# Patient Record
Sex: MALE | Race: WHITE | NOT HISPANIC OR LATINO | URBAN - METROPOLITAN AREA
[De-identification: names, ages, dates, MRNs, and addresses within clinical notes are randomized per-mention and may not be internally consistent; named-entity substitution may affect disease eponyms.]

---

## 2017-04-03 ENCOUNTER — COMMUNICATION - HEALTHEAST (OUTPATIENT)
Dept: FAMILY MEDICINE | Facility: CLINIC | Age: 77
End: 2017-04-03

## 2017-04-06 ENCOUNTER — COMMUNICATION - HEALTHEAST (OUTPATIENT)
Dept: FAMILY MEDICINE | Facility: CLINIC | Age: 77
End: 2017-04-06

## 2017-05-04 ENCOUNTER — OFFICE VISIT - HEALTHEAST (OUTPATIENT)
Dept: FAMILY MEDICINE | Facility: CLINIC | Age: 77
End: 2017-05-04

## 2017-05-04 DIAGNOSIS — J44.89 CHRONIC AIRWAY OBSTRUCTION, NOT ELSEWHERE CLASSIFIED: ICD-10-CM

## 2017-05-04 DIAGNOSIS — G60.9 HEREDITARY AND IDIOPATHIC PERIPHERAL NEUROPATHY: ICD-10-CM

## 2017-05-04 DIAGNOSIS — D51.0 PERNICIOUS ANEMIA: ICD-10-CM

## 2017-05-04 DIAGNOSIS — K59.00 CONSTIPATION: ICD-10-CM

## 2017-05-04 DIAGNOSIS — G40.909 EPILEPSY (H): ICD-10-CM

## 2017-05-04 DIAGNOSIS — K64.4 EXTERNAL HEMORRHOIDS: ICD-10-CM

## 2017-05-04 DIAGNOSIS — E78.5 HYPERLIPIDEMIA, UNSPECIFIED HYPERLIPIDEMIA TYPE: ICD-10-CM

## 2017-05-04 LAB
CHOLEST SERPL-MCNC: 186 MG/DL
FASTING STATUS PATIENT QL REPORTED: NO
HDLC SERPL-MCNC: 36 MG/DL
LDLC SERPL CALC-MCNC: 132 MG/DL
TRIGL SERPL-MCNC: 89 MG/DL

## 2017-05-07 ENCOUNTER — COMMUNICATION - HEALTHEAST (OUTPATIENT)
Dept: FAMILY MEDICINE | Facility: CLINIC | Age: 77
End: 2017-05-07

## 2017-05-07 DIAGNOSIS — G40.909 EPILEPSY (H): ICD-10-CM

## 2017-05-08 ENCOUNTER — COMMUNICATION - HEALTHEAST (OUTPATIENT)
Dept: FAMILY MEDICINE | Facility: CLINIC | Age: 77
End: 2017-05-08

## 2017-05-10 ENCOUNTER — RECORDS - HEALTHEAST (OUTPATIENT)
Dept: ADMINISTRATIVE | Facility: OTHER | Age: 77
End: 2017-05-10

## 2017-05-15 ENCOUNTER — COMMUNICATION - HEALTHEAST (OUTPATIENT)
Dept: FAMILY MEDICINE | Facility: CLINIC | Age: 77
End: 2017-05-15

## 2017-05-15 DIAGNOSIS — I10 ESSENTIAL HYPERTENSION: ICD-10-CM

## 2017-05-16 ENCOUNTER — COMMUNICATION - HEALTHEAST (OUTPATIENT)
Dept: LAB | Facility: CLINIC | Age: 77
End: 2017-05-16

## 2017-05-16 DIAGNOSIS — Z80.42 FAMILY HISTORY OF PROSTATE CANCER: ICD-10-CM

## 2017-05-16 DIAGNOSIS — Z00.00 PREVENTATIVE HEALTH CARE: ICD-10-CM

## 2017-05-16 DIAGNOSIS — R97.20 ELEVATED PSA: ICD-10-CM

## 2017-05-18 ENCOUNTER — AMBULATORY - HEALTHEAST (OUTPATIENT)
Dept: LAB | Facility: CLINIC | Age: 77
End: 2017-05-18

## 2017-05-18 DIAGNOSIS — R97.20 ELEVATED PSA: ICD-10-CM

## 2017-05-18 LAB — PSA SERPL-MCNC: 2 NG/ML (ref 0–6.5)

## 2017-05-19 ENCOUNTER — COMMUNICATION - HEALTHEAST (OUTPATIENT)
Dept: FAMILY MEDICINE | Facility: CLINIC | Age: 77
End: 2017-05-19

## 2017-06-20 ENCOUNTER — COMMUNICATION - HEALTHEAST (OUTPATIENT)
Dept: FAMILY MEDICINE | Facility: CLINIC | Age: 77
End: 2017-06-20

## 2017-06-20 DIAGNOSIS — I10 UNSPECIFIED ESSENTIAL HYPERTENSION: ICD-10-CM

## 2017-07-02 ENCOUNTER — COMMUNICATION - HEALTHEAST (OUTPATIENT)
Dept: FAMILY MEDICINE | Facility: CLINIC | Age: 77
End: 2017-07-02

## 2017-07-02 DIAGNOSIS — N40.0 BPH (BENIGN PROSTATIC HYPERPLASIA): ICD-10-CM

## 2017-09-18 ENCOUNTER — COMMUNICATION - HEALTHEAST (OUTPATIENT)
Dept: FAMILY MEDICINE | Facility: CLINIC | Age: 77
End: 2017-09-18

## 2017-09-18 DIAGNOSIS — I10 UNSPECIFIED ESSENTIAL HYPERTENSION: ICD-10-CM

## 2017-11-11 ENCOUNTER — COMMUNICATION - HEALTHEAST (OUTPATIENT)
Dept: FAMILY MEDICINE | Facility: CLINIC | Age: 77
End: 2017-11-11

## 2017-11-11 DIAGNOSIS — I10 ESSENTIAL HYPERTENSION: ICD-10-CM

## 2017-12-17 ENCOUNTER — COMMUNICATION - HEALTHEAST (OUTPATIENT)
Dept: FAMILY MEDICINE | Facility: CLINIC | Age: 77
End: 2017-12-17

## 2017-12-17 DIAGNOSIS — I10 ESSENTIAL HYPERTENSION: ICD-10-CM

## 2017-12-20 ENCOUNTER — COMMUNICATION - HEALTHEAST (OUTPATIENT)
Dept: FAMILY MEDICINE | Facility: CLINIC | Age: 77
End: 2017-12-20

## 2018-01-16 ENCOUNTER — OFFICE VISIT - HEALTHEAST (OUTPATIENT)
Dept: FAMILY MEDICINE | Facility: CLINIC | Age: 78
End: 2018-01-16

## 2018-01-16 DIAGNOSIS — F17.200 TOBACCO USE DISORDER: ICD-10-CM
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
## 2018-01-16 DIAGNOSIS — M79.602 LEFT ARM PAIN: ICD-10-CM

## 2018-01-16 DIAGNOSIS — J44.9 CHRONIC OBSTRUCTIVE PULMONARY DISEASE, UNSPECIFIED COPD TYPE (H): ICD-10-CM

## 2018-01-16 DIAGNOSIS — R05.8 PRODUCTIVE COUGH: ICD-10-CM

## 2018-01-16 DIAGNOSIS — G40.909 NONINTRACTABLE EPILEPSY WITHOUT STATUS EPILEPTICUS, UNSPECIFIED EPILEPSY TYPE (H): ICD-10-CM

## 2018-01-16 DIAGNOSIS — R63.4 ABNORMAL WEIGHT LOSS: ICD-10-CM

## 2018-01-16 DIAGNOSIS — I10 ESSENTIAL HYPERTENSION: ICD-10-CM

## 2018-01-16 LAB
ALBUMIN SERPL-MCNC: 3.8 G/DL (ref 3.5–5)
ALP SERPL-CCNC: 75 U/L (ref 45–120)
ALT SERPL W P-5'-P-CCNC: 10 U/L (ref 0–45)
ANION GAP SERPL CALCULATED.3IONS-SCNC: 9 MMOL/L (ref 5–18)
AST SERPL W P-5'-P-CCNC: 12 U/L (ref 0–40)
BILIRUB SERPL-MCNC: 0.6 MG/DL (ref 0–1)
BUN SERPL-MCNC: 12 MG/DL (ref 8–28)
CALCIUM SERPL-MCNC: 9.8 MG/DL (ref 8.5–10.5)
CHLORIDE BLD-SCNC: 106 MMOL/L (ref 98–107)
CO2 SERPL-SCNC: 26 MMOL/L (ref 22–31)
CREAT SERPL-MCNC: 0.68 MG/DL (ref 0.7–1.3)
ERYTHROCYTE [DISTWIDTH] IN BLOOD BY AUTOMATED COUNT: 12.2 % (ref 11–14.5)
GFR SERPL CREATININE-BSD FRML MDRD: >60 ML/MIN/1.73M2
GLUCOSE BLD-MCNC: 122 MG/DL (ref 70–125)
HCT VFR BLD AUTO: 47.6 % (ref 40–54)
HGB BLD-MCNC: 15.7 G/DL (ref 14–18)
MCH RBC QN AUTO: 31.5 PG (ref 27–34)
MCHC RBC AUTO-ENTMCNC: 32.9 G/DL (ref 32–36)
MCV RBC AUTO: 96 FL (ref 80–100)
PLATELET # BLD AUTO: 205 THOU/UL (ref 140–440)
PMV BLD AUTO: 7.4 FL (ref 7–10)
POTASSIUM BLD-SCNC: 4.4 MMOL/L (ref 3.5–5)
PROT SERPL-MCNC: 7.3 G/DL (ref 6–8)
RBC # BLD AUTO: 4.97 MILL/UL (ref 4.4–6.2)
SODIUM SERPL-SCNC: 141 MMOL/L (ref 136–145)
TSH SERPL DL<=0.005 MIU/L-ACNC: 0.68 UIU/ML (ref 0.3–5)
WBC: 5 THOU/UL (ref 4–11)

## 2018-01-17 ENCOUNTER — COMMUNICATION - HEALTHEAST (OUTPATIENT)
Dept: FAMILY MEDICINE | Facility: CLINIC | Age: 78
End: 2018-01-17

## 2018-01-18 ENCOUNTER — AMBULATORY - HEALTHEAST (OUTPATIENT)
Dept: FAMILY MEDICINE | Facility: CLINIC | Age: 78
End: 2018-01-18

## 2018-01-18 DIAGNOSIS — R29.6 FREQUENT FALLS: ICD-10-CM

## 2018-01-22 ENCOUNTER — COMMUNICATION - HEALTHEAST (OUTPATIENT)
Dept: NURSING | Facility: CLINIC | Age: 78
End: 2018-01-22

## 2018-03-18 ENCOUNTER — COMMUNICATION - HEALTHEAST (OUTPATIENT)
Dept: FAMILY MEDICINE | Facility: CLINIC | Age: 78
End: 2018-03-18

## 2018-03-18 DIAGNOSIS — I10 ESSENTIAL HYPERTENSION: ICD-10-CM

## 2018-04-01 ENCOUNTER — COMMUNICATION - HEALTHEAST (OUTPATIENT)
Dept: FAMILY MEDICINE | Facility: CLINIC | Age: 78
End: 2018-04-01

## 2018-04-01 DIAGNOSIS — N40.0 BPH (BENIGN PROSTATIC HYPERPLASIA): ICD-10-CM

## 2018-05-05 ENCOUNTER — COMMUNICATION - HEALTHEAST (OUTPATIENT)
Dept: FAMILY MEDICINE | Facility: CLINIC | Age: 78
End: 2018-05-05

## 2018-05-05 DIAGNOSIS — G40.909 EPILEPSY (H): ICD-10-CM

## 2018-05-05 DIAGNOSIS — I10 ESSENTIAL HYPERTENSION: ICD-10-CM

## 2018-07-18 ENCOUNTER — HOSPITAL ENCOUNTER (OUTPATIENT)
Dept: PHYSICAL MEDICINE AND REHAB | Facility: CLINIC | Age: 78
Discharge: HOME | End: 2018-07-18
Attending: PHYSICAL MEDICINE & REHABILITATION

## 2018-07-18 DIAGNOSIS — G72.9 MYOPATHY: ICD-10-CM

## 2018-07-18 DIAGNOSIS — M12.88 OTHER SPECIFIC ARTHROPATHIES, NOT ELSEWHERE CLASSIFIED, OTHER SPECIFIED SITE: ICD-10-CM

## 2018-07-18 DIAGNOSIS — M54.50 LUMBAR SPINE PAIN: ICD-10-CM

## 2018-07-18 DIAGNOSIS — M62.81 PROXIMAL MUSCLE WEAKNESS: ICD-10-CM

## 2018-07-18 DIAGNOSIS — M79.18 MYOFASCIAL PAIN: ICD-10-CM

## 2018-07-18 DIAGNOSIS — M47.816 LUMBAR SPONDYLOSIS: ICD-10-CM

## 2018-07-18 DIAGNOSIS — M47.816 ARTHROPATHY OF LUMBAR FACET JOINT: ICD-10-CM

## 2018-07-18 ASSESSMENT — MIFFLIN-ST. JEOR: SCORE: 1511.85

## 2018-07-26 ENCOUNTER — COMMUNICATION - HEALTHEAST (OUTPATIENT)
Dept: PHYSICAL MEDICINE AND REHAB | Facility: CLINIC | Age: 78
End: 2018-07-26

## 2018-07-26 DIAGNOSIS — M47.816 FACET ARTHROPATHY, LUMBAR: ICD-10-CM

## 2018-07-30 ENCOUNTER — COMMUNICATION - HEALTHEAST (OUTPATIENT)
Dept: PHYSICAL MEDICINE AND REHAB | Facility: CLINIC | Age: 78
End: 2018-07-30

## 2018-08-01 ENCOUNTER — HOSPITAL ENCOUNTER (OUTPATIENT)
Dept: PHYSICAL MEDICINE AND REHAB | Facility: CLINIC | Age: 78
Discharge: HOME | End: 2018-08-01
Attending: PHYSICAL MEDICINE & REHABILITATION

## 2018-08-01 DIAGNOSIS — M47.816 FACET ARTHROPATHY, LUMBAR: ICD-10-CM

## 2018-08-01 DIAGNOSIS — M12.88 OTHER SPECIFIC ARTHROPATHIES, NOT ELSEWHERE CLASSIFIED, OTHER SPECIFIED SITE: ICD-10-CM

## 2018-08-04 ENCOUNTER — COMMUNICATION - HEALTHEAST (OUTPATIENT)
Dept: FAMILY MEDICINE | Facility: CLINIC | Age: 78
End: 2018-08-04

## 2018-08-04 DIAGNOSIS — G40.909 EPILEPSY (H): ICD-10-CM

## 2018-08-04 DIAGNOSIS — I10 ESSENTIAL HYPERTENSION: ICD-10-CM

## 2018-08-16 ENCOUNTER — HOSPITAL ENCOUNTER (OUTPATIENT)
Dept: PHYSICAL MEDICINE AND REHAB | Facility: CLINIC | Age: 78
Discharge: HOME | End: 2018-08-16
Attending: PHYSICAL MEDICINE & REHABILITATION

## 2018-08-16 DIAGNOSIS — G72.9 MYOPATHY: ICD-10-CM

## 2018-08-16 DIAGNOSIS — M47.816 FACET ARTHROPATHY, LUMBAR: ICD-10-CM

## 2018-08-16 DIAGNOSIS — M62.81 PROXIMAL MUSCLE WEAKNESS: ICD-10-CM

## 2018-08-16 DIAGNOSIS — M54.50 LUMBAR SPINE PAIN: ICD-10-CM

## 2018-08-16 DIAGNOSIS — M79.18 MYOFASCIAL PAIN: ICD-10-CM

## 2018-08-16 ASSESSMENT — MIFFLIN-ST. JEOR: SCORE: 1511.85

## 2018-09-19 ENCOUNTER — RECORDS - HEALTHEAST (OUTPATIENT)
Dept: ADMINISTRATIVE | Facility: OTHER | Age: 78
End: 2018-09-19

## 2018-11-03 ENCOUNTER — COMMUNICATION - HEALTHEAST (OUTPATIENT)
Dept: FAMILY MEDICINE | Facility: CLINIC | Age: 78
End: 2018-11-03

## 2018-11-03 DIAGNOSIS — I10 ESSENTIAL HYPERTENSION: ICD-10-CM

## 2018-11-03 DIAGNOSIS — G40.909 EPILEPSY (H): ICD-10-CM

## 2018-12-05 ENCOUNTER — HOSPITAL ENCOUNTER (OUTPATIENT)
Dept: PHYSICAL MEDICINE AND REHAB | Facility: CLINIC | Age: 78
Discharge: HOME | End: 2018-12-05
Attending: PHYSICAL MEDICINE & REHABILITATION

## 2018-12-05 DIAGNOSIS — M47.816 FACET ARTHROPATHY, LUMBAR: ICD-10-CM

## 2018-12-05 DIAGNOSIS — M62.81 PROXIMAL MUSCLE WEAKNESS: ICD-10-CM

## 2018-12-05 DIAGNOSIS — G72.9 MYOPATHY: ICD-10-CM

## 2018-12-05 DIAGNOSIS — M79.18 MYOFASCIAL PAIN: ICD-10-CM

## 2018-12-05 DIAGNOSIS — M54.50 LUMBAR SPINE PAIN: ICD-10-CM

## 2018-12-05 ASSESSMENT — MIFFLIN-ST. JEOR: SCORE: 1516.39

## 2018-12-18 ENCOUNTER — COMMUNICATION - HEALTHEAST (OUTPATIENT)
Dept: FAMILY MEDICINE | Facility: CLINIC | Age: 78
End: 2018-12-18

## 2018-12-18 ENCOUNTER — OFFICE VISIT - HEALTHEAST (OUTPATIENT)
Dept: FAMILY MEDICINE | Facility: CLINIC | Age: 78
End: 2018-12-18

## 2018-12-18 DIAGNOSIS — I10 ESSENTIAL HYPERTENSION: ICD-10-CM

## 2018-12-18 DIAGNOSIS — R29.898 LEG WEAKNESS, BILATERAL: ICD-10-CM

## 2018-12-18 DIAGNOSIS — F33.1 MAJOR DEPRESSIVE DISORDER, RECURRENT EPISODE, MODERATE (H): ICD-10-CM

## 2018-12-18 DIAGNOSIS — I73.9 PERIPHERAL ARTERIAL DISEASE (H): ICD-10-CM

## 2018-12-18 DIAGNOSIS — R05.9 COUGH: ICD-10-CM

## 2019-01-01 ENCOUNTER — COMMUNICATION - HEALTHEAST (OUTPATIENT)
Dept: HOME HEALTH SERVICES | Facility: HOME HEALTH | Age: 79
End: 2019-01-01

## 2019-01-01 ENCOUNTER — HOME CARE/HOSPICE - HEALTHEAST (OUTPATIENT)
Dept: HOME HEALTH SERVICES | Facility: HOME HEALTH | Age: 79
End: 2019-01-01

## 2019-01-01 ENCOUNTER — COMMUNICATION - HEALTHEAST (OUTPATIENT)
Dept: FAMILY MEDICINE | Facility: CLINIC | Age: 79
End: 2019-01-01

## 2019-01-01 DIAGNOSIS — R29.898 WEAKNESS OF LEFT LOWER EXTREMITY: ICD-10-CM

## 2019-01-01 DIAGNOSIS — R29.6 FALLS FREQUENTLY: ICD-10-CM

## 2019-01-01 DIAGNOSIS — R29.898 LEG WEAKNESS, BILATERAL: ICD-10-CM

## 2019-01-15 ENCOUNTER — OFFICE VISIT - HEALTHEAST (OUTPATIENT)
Dept: FAMILY MEDICINE | Facility: CLINIC | Age: 79
End: 2019-01-15

## 2019-01-15 ENCOUNTER — HOSPITAL ENCOUNTER (OUTPATIENT)
Dept: LAB | Age: 79
Setting detail: SPECIMEN
Discharge: HOME | End: 2019-01-15

## 2019-01-15 DIAGNOSIS — I10 ESSENTIAL HYPERTENSION: ICD-10-CM

## 2019-01-15 DIAGNOSIS — R29.898 LEG WEAKNESS, BILATERAL: ICD-10-CM

## 2019-01-15 DIAGNOSIS — R13.10 DYSPHAGIA, UNSPECIFIED TYPE: ICD-10-CM

## 2019-01-15 DIAGNOSIS — R63.4 ABNORMAL WEIGHT LOSS: ICD-10-CM

## 2019-01-15 DIAGNOSIS — F33.1 MAJOR DEPRESSIVE DISORDER, RECURRENT EPISODE, MODERATE (H): ICD-10-CM

## 2019-01-15 DIAGNOSIS — Z00.01 ENCOUNTER FOR GENERAL ADULT MEDICAL EXAMINATION WITH ABNORMAL FINDINGS: ICD-10-CM

## 2019-01-15 DIAGNOSIS — J44.9 CHRONIC OBSTRUCTIVE PULMONARY DISEASE, UNSPECIFIED COPD TYPE (H): ICD-10-CM

## 2019-01-15 DIAGNOSIS — G40.909 NONINTRACTABLE EPILEPSY WITHOUT STATUS EPILEPTICUS, UNSPECIFIED EPILEPSY TYPE (H): ICD-10-CM

## 2019-01-15 DIAGNOSIS — I73.9 PERIPHERAL ARTERIAL DISEASE (H): ICD-10-CM

## 2019-01-15 LAB
ALBUMIN SERPL-MCNC: 3.8 G/DL (ref 3.5–5)
ALP SERPL-CCNC: 68 U/L (ref 45–120)
ALT SERPL W P-5'-P-CCNC: 10 U/L (ref 0–45)
ANION GAP SERPL CALCULATED.3IONS-SCNC: 8 MMOL/L (ref 5–18)
AST SERPL W P-5'-P-CCNC: 11 U/L (ref 0–40)
ATRIAL RATE - MUSE: 52 BPM
BILIRUB SERPL-MCNC: 0.7 MG/DL (ref 0–1)
BUN SERPL-MCNC: 9 MG/DL (ref 8–28)
CALCIUM SERPL-MCNC: 9.9 MG/DL (ref 8.5–10.5)
CHLORIDE BLD-SCNC: 106 MMOL/L (ref 98–107)
CK SERPL-CCNC: 47 U/L (ref 30–190)
CO2 SERPL-SCNC: 27 MMOL/L (ref 22–31)
CREAT SERPL-MCNC: 0.71 MG/DL (ref 0.7–1.3)
DIASTOLIC BLOOD PRESSURE - MUSE: NORMAL MMHG
ERYTHROCYTE [DISTWIDTH] IN BLOOD BY AUTOMATED COUNT: 12.3 % (ref 11–14.5)
GFR SERPL CREATININE-BSD FRML MDRD: >60 ML/MIN/1.73M2
GLUCOSE BLD-MCNC: 103 MG/DL (ref 70–125)
HCT VFR BLD AUTO: 45.2 % (ref 40–54)
HGB BLD-MCNC: 14.9 G/DL (ref 14–18)
INTERPRETATION ECG - MUSE: NORMAL
MCH RBC QN AUTO: 31.5 PG (ref 27–34)
MCHC RBC AUTO-ENTMCNC: 32.9 G/DL (ref 32–36)
MCV RBC AUTO: 96 FL (ref 80–100)
P AXIS - MUSE: 83 DEGREES
PLATELET # BLD AUTO: 196 THOU/UL (ref 140–440)
PMV BLD AUTO: 7.4 FL (ref 7–10)
POTASSIUM BLD-SCNC: 4.3 MMOL/L (ref 3.5–5)
PR INTERVAL - MUSE: 192 MS
PROT SERPL-MCNC: 7.1 G/DL (ref 6–8)
QRS DURATION - MUSE: 90 MS
QT - MUSE: 416 MS
QTC - MUSE: 386 MS
R AXIS - MUSE: 56 DEGREES
RBC # BLD AUTO: 4.72 MILL/UL (ref 4.4–6.2)
SODIUM SERPL-SCNC: 141 MMOL/L (ref 136–145)
SYSTOLIC BLOOD PRESSURE - MUSE: NORMAL MMHG
T AXIS - MUSE: 64 DEGREES
TSH SERPL DL<=0.005 MIU/L-ACNC: 0.55 UIU/ML (ref 0.3–5)
VENTRICULAR RATE- MUSE: 52 BPM
WBC: 4.3 THOU/UL (ref 4–11)

## 2019-01-15 ASSESSMENT — MIFFLIN-ST. JEOR: SCORE: 1520.92

## 2019-01-16 ENCOUNTER — COMMUNICATION - HEALTHEAST (OUTPATIENT)
Dept: FAMILY MEDICINE | Facility: CLINIC | Age: 79
End: 2019-01-16

## 2019-01-26 ENCOUNTER — COMMUNICATION - HEALTHEAST (OUTPATIENT)
Dept: FAMILY MEDICINE | Facility: CLINIC | Age: 79
End: 2019-01-26

## 2019-01-26 DIAGNOSIS — G40.909 EPILEPSY (H): ICD-10-CM

## 2019-02-03 ENCOUNTER — COMMUNICATION - HEALTHEAST (OUTPATIENT)
Dept: FAMILY MEDICINE | Facility: CLINIC | Age: 79
End: 2019-02-03

## 2019-02-03 DIAGNOSIS — I10 ESSENTIAL HYPERTENSION: ICD-10-CM

## 2019-02-05 ENCOUNTER — RECORDS - HEALTHEAST (OUTPATIENT)
Dept: ADMINISTRATIVE | Facility: OTHER | Age: 79
End: 2019-02-05

## 2019-03-06 ENCOUNTER — OFFICE VISIT - HEALTHEAST (OUTPATIENT)
Dept: FAMILY MEDICINE | Facility: CLINIC | Age: 79
End: 2019-03-06

## 2019-03-06 DIAGNOSIS — I10 ESSENTIAL HYPERTENSION: ICD-10-CM

## 2019-03-06 DIAGNOSIS — G40.909 NONINTRACTABLE EPILEPSY WITHOUT STATUS EPILEPTICUS, UNSPECIFIED EPILEPSY TYPE (H): ICD-10-CM

## 2019-03-06 DIAGNOSIS — R29.898 LEG WEAKNESS, BILATERAL: ICD-10-CM

## 2019-03-06 DIAGNOSIS — I73.9 PERIPHERAL ARTERIAL DISEASE (H): ICD-10-CM

## 2019-03-06 DIAGNOSIS — J44.9 CHRONIC OBSTRUCTIVE PULMONARY DISEASE, UNSPECIFIED COPD TYPE (H): ICD-10-CM

## 2019-03-06 DIAGNOSIS — R42 DIZZY SPELLS: ICD-10-CM

## 2019-03-06 RX ORDER — MULTIPLE VITAMINS W/ MINERALS TAB 9MG-400MCG
1 TAB ORAL DAILY
Status: SHIPPED | COMMUNITY
Start: 2019-03-06

## 2019-03-09 ENCOUNTER — COMMUNICATION - HEALTHEAST (OUTPATIENT)
Dept: FAMILY MEDICINE | Facility: CLINIC | Age: 79
End: 2019-03-09

## 2019-03-09 DIAGNOSIS — I10 ESSENTIAL HYPERTENSION: ICD-10-CM

## 2019-04-19 ENCOUNTER — RECORDS - HEALTHEAST (OUTPATIENT)
Dept: ADMINISTRATIVE | Facility: OTHER | Age: 79
End: 2019-04-19

## 2019-05-01 ENCOUNTER — OFFICE VISIT - HEALTHEAST (OUTPATIENT)
Dept: FAMILY MEDICINE | Facility: CLINIC | Age: 79
End: 2019-05-01

## 2019-05-01 ENCOUNTER — AMBULATORY - HEALTHEAST (OUTPATIENT)
Dept: PULMONOLOGY | Facility: OTHER | Age: 79
End: 2019-05-01

## 2019-05-01 DIAGNOSIS — R05.3 CHRONIC COUGH: ICD-10-CM

## 2019-05-01 DIAGNOSIS — F17.200 TOBACCO USE DISORDER: ICD-10-CM

## 2019-05-01 DIAGNOSIS — J44.9 COPD (CHRONIC OBSTRUCTIVE PULMONARY DISEASE) (H): ICD-10-CM

## 2019-05-01 DIAGNOSIS — J44.1 CHRONIC OBSTRUCTIVE PULMONARY DISEASE WITH ACUTE EXACERBATION (H): ICD-10-CM

## 2019-05-02 ENCOUNTER — AMBULATORY - HEALTHEAST (OUTPATIENT)
Dept: OTHER | Facility: CLINIC | Age: 79
End: 2019-05-02

## 2019-05-02 ENCOUNTER — DOCUMENTATION ONLY (OUTPATIENT)
Dept: OTHER | Facility: CLINIC | Age: 79
End: 2019-05-02

## 2019-05-13 ENCOUNTER — HOSPITAL ENCOUNTER (OUTPATIENT)
Dept: CT IMAGING | Facility: HOSPITAL | Age: 79
Discharge: HOME | End: 2019-05-13

## 2019-05-13 DIAGNOSIS — R05.3 CHRONIC COUGH: ICD-10-CM

## 2019-05-14 ENCOUNTER — COMMUNICATION - HEALTHEAST (OUTPATIENT)
Dept: FAMILY MEDICINE | Facility: CLINIC | Age: 79
End: 2019-05-14

## 2019-05-21 ENCOUNTER — COMMUNICATION - HEALTHEAST (OUTPATIENT)
Dept: ADMINISTRATIVE | Facility: CLINIC | Age: 79
End: 2019-05-21

## 2019-06-04 ENCOUNTER — HOSPITAL ENCOUNTER (OUTPATIENT)
Dept: RESPIRATORY THERAPY | Facility: HOSPITAL | Age: 79
Discharge: HOME | End: 2019-06-04
Attending: INTERNAL MEDICINE

## 2019-06-04 DIAGNOSIS — R05.3 CHRONIC COUGH: ICD-10-CM

## 2019-06-04 DIAGNOSIS — J44.9 COPD (CHRONIC OBSTRUCTIVE PULMONARY DISEASE) (H): ICD-10-CM

## 2019-06-04 LAB — HGB BLD-MCNC: 15 G/DL (ref 14–18)

## 2019-06-10 ENCOUNTER — OFFICE VISIT - HEALTHEAST (OUTPATIENT)
Dept: PULMONOLOGY | Facility: OTHER | Age: 79
End: 2019-06-10

## 2019-06-10 ENCOUNTER — RECORDS - HEALTHEAST (OUTPATIENT)
Dept: ADMINISTRATIVE | Facility: OTHER | Age: 79
End: 2019-06-10

## 2019-06-10 DIAGNOSIS — J41.0 SMOKERS' COUGH (H): ICD-10-CM

## 2019-06-10 DIAGNOSIS — R13.19 ESOPHAGEAL DYSPHAGIA: ICD-10-CM

## 2019-06-10 ASSESSMENT — MIFFLIN-ST. JEOR: SCORE: 1493.71

## 2019-06-11 ENCOUNTER — AMBULATORY - HEALTHEAST (OUTPATIENT)
Dept: PULMONOLOGY | Facility: OTHER | Age: 79
End: 2019-06-11

## 2019-06-11 DIAGNOSIS — R05.9 COUGH: ICD-10-CM

## 2019-06-20 ENCOUNTER — HOSPITAL ENCOUNTER (OUTPATIENT)
Dept: RADIOLOGY | Facility: HOSPITAL | Age: 79
Discharge: HOME | End: 2019-06-20
Attending: INTERNAL MEDICINE

## 2019-06-20 DIAGNOSIS — R13.19 ESOPHAGEAL DYSPHAGIA: ICD-10-CM

## 2019-06-20 DIAGNOSIS — R05.9 COUGH: ICD-10-CM

## 2019-06-23 ENCOUNTER — COMMUNICATION - HEALTHEAST (OUTPATIENT)
Dept: FAMILY MEDICINE | Facility: CLINIC | Age: 79
End: 2019-06-23

## 2019-06-23 DIAGNOSIS — N40.0 BPH (BENIGN PROSTATIC HYPERPLASIA): ICD-10-CM

## 2019-06-24 ENCOUNTER — COMMUNICATION - HEALTHEAST (OUTPATIENT)
Dept: PULMONOLOGY | Facility: OTHER | Age: 79
End: 2019-06-24

## 2019-07-01 ENCOUNTER — COMMUNICATION - HEALTHEAST (OUTPATIENT)
Dept: PULMONOLOGY | Facility: OTHER | Age: 79
End: 2019-07-01

## 2019-08-27 ENCOUNTER — RECORDS - HEALTHEAST (OUTPATIENT)
Dept: ADMINISTRATIVE | Facility: OTHER | Age: 79
End: 2019-08-27

## 2019-10-04 ENCOUNTER — COMMUNICATION - HEALTHEAST (OUTPATIENT)
Dept: FAMILY MEDICINE | Facility: CLINIC | Age: 79
End: 2019-10-04

## 2019-10-16 ENCOUNTER — OFFICE VISIT - HEALTHEAST (OUTPATIENT)
Dept: FAMILY MEDICINE | Facility: CLINIC | Age: 79
End: 2019-10-16

## 2019-10-16 DIAGNOSIS — I10 ESSENTIAL HYPERTENSION: ICD-10-CM

## 2019-10-16 DIAGNOSIS — R29.6 FALLS FREQUENTLY: ICD-10-CM

## 2019-10-16 DIAGNOSIS — J44.1 CHRONIC OBSTRUCTIVE PULMONARY DISEASE WITH ACUTE EXACERBATION (H): ICD-10-CM

## 2019-10-16 DIAGNOSIS — R29.898 LEG WEAKNESS, BILATERAL: ICD-10-CM

## 2019-10-16 DIAGNOSIS — G40.909 NONINTRACTABLE EPILEPSY WITHOUT STATUS EPILEPTICUS, UNSPECIFIED EPILEPSY TYPE (H): ICD-10-CM

## 2019-10-16 DIAGNOSIS — F33.1 MAJOR DEPRESSIVE DISORDER, RECURRENT EPISODE, MODERATE (H): ICD-10-CM

## 2019-10-16 DIAGNOSIS — I73.9 PERIPHERAL ARTERIAL DISEASE (H): ICD-10-CM

## 2019-10-16 LAB
ALBUMIN SERPL-MCNC: 3.6 G/DL (ref 3.5–5)
ALP SERPL-CCNC: 64 U/L (ref 45–120)
ALT SERPL W P-5'-P-CCNC: 9 U/L (ref 0–45)
ANION GAP SERPL CALCULATED.3IONS-SCNC: 10 MMOL/L (ref 5–18)
AST SERPL W P-5'-P-CCNC: 11 U/L (ref 0–40)
BILIRUB SERPL-MCNC: 0.7 MG/DL (ref 0–1)
BUN SERPL-MCNC: 10 MG/DL (ref 8–28)
CALCIUM SERPL-MCNC: 9.7 MG/DL (ref 8.5–10.5)
CHLORIDE BLD-SCNC: 105 MMOL/L (ref 98–107)
CK SERPL-CCNC: 58 U/L (ref 30–190)
CO2 SERPL-SCNC: 25 MMOL/L (ref 22–31)
CREAT SERPL-MCNC: 0.74 MG/DL (ref 0.7–1.3)
ERYTHROCYTE [DISTWIDTH] IN BLOOD BY AUTOMATED COUNT: 12 % (ref 11–14.5)
GFR SERPL CREATININE-BSD FRML MDRD: >60 ML/MIN/1.73M2
GLUCOSE BLD-MCNC: 121 MG/DL (ref 70–125)
HCT VFR BLD AUTO: 44.5 % (ref 40–54)
HGB BLD-MCNC: 15 G/DL (ref 14–18)
MCH RBC QN AUTO: 31.9 PG (ref 27–34)
MCHC RBC AUTO-ENTMCNC: 33.8 G/DL (ref 32–36)
MCV RBC AUTO: 95 FL (ref 80–100)
PLATELET # BLD AUTO: 169 THOU/UL (ref 140–440)
PMV BLD AUTO: 8.1 FL (ref 7–10)
POTASSIUM BLD-SCNC: 3.6 MMOL/L (ref 3.5–5)
PROT SERPL-MCNC: 6.6 G/DL (ref 6–8)
RBC # BLD AUTO: 4.71 MILL/UL (ref 4.4–6.2)
SODIUM SERPL-SCNC: 140 MMOL/L (ref 136–145)
TSH SERPL DL<=0.005 MIU/L-ACNC: 0.36 UIU/ML (ref 0.3–5)
WBC: 4.4 THOU/UL (ref 4–11)

## 2019-10-17 ENCOUNTER — COMMUNICATION - HEALTHEAST (OUTPATIENT)
Dept: FAMILY MEDICINE | Facility: CLINIC | Age: 79
End: 2019-10-17

## 2019-10-17 ENCOUNTER — HOME CARE/HOSPICE - HEALTHEAST (OUTPATIENT)
Dept: HOME HEALTH SERVICES | Facility: HOME HEALTH | Age: 79
End: 2019-10-17

## 2019-10-18 ENCOUNTER — RECORDS - HEALTHEAST (OUTPATIENT)
Dept: ADMINISTRATIVE | Facility: OTHER | Age: 79
End: 2019-10-18

## 2019-10-21 ENCOUNTER — COMMUNICATION - HEALTHEAST (OUTPATIENT)
Dept: SCHEDULING | Facility: CLINIC | Age: 79
End: 2019-10-21

## 2019-10-24 ENCOUNTER — COMMUNICATION - HEALTHEAST (OUTPATIENT)
Dept: FAMILY MEDICINE | Facility: CLINIC | Age: 79
End: 2019-10-24

## 2019-10-31 ENCOUNTER — COMMUNICATION - HEALTHEAST (OUTPATIENT)
Dept: SCHEDULING | Facility: CLINIC | Age: 79
End: 2019-10-31

## 2019-11-15 ENCOUNTER — COMMUNICATION - HEALTHEAST (OUTPATIENT)
Dept: FAMILY MEDICINE | Facility: CLINIC | Age: 79
End: 2019-11-15

## 2019-12-03 ENCOUNTER — COMMUNICATION - HEALTHEAST (OUTPATIENT)
Dept: FAMILY MEDICINE | Facility: CLINIC | Age: 79
End: 2019-12-03

## 2020-01-01 ENCOUNTER — COMMUNICATION - HEALTHEAST (OUTPATIENT)
Dept: FAMILY MEDICINE | Facility: CLINIC | Age: 80
End: 2020-01-01

## 2020-01-01 ENCOUNTER — COMMUNICATION - HEALTHEAST (OUTPATIENT)
Dept: SCHEDULING | Facility: CLINIC | Age: 80
End: 2020-01-01

## 2020-01-01 ENCOUNTER — COMMUNICATION - HEALTHEAST (OUTPATIENT)
Dept: HOME HEALTH SERVICES | Facility: HOME HEALTH | Age: 80
End: 2020-01-01

## 2020-01-01 ENCOUNTER — HOME CARE/HOSPICE - HEALTHEAST (OUTPATIENT)
Dept: HOME HEALTH SERVICES | Facility: HOME HEALTH | Age: 80
End: 2020-01-01

## 2020-01-01 ENCOUNTER — RECORDS - HEALTHEAST (OUTPATIENT)
Dept: FAMILY MEDICINE | Facility: CLINIC | Age: 80
End: 2020-01-01

## 2020-01-01 ENCOUNTER — OFFICE VISIT - HEALTHEAST (OUTPATIENT)
Dept: FAMILY MEDICINE | Facility: CLINIC | Age: 80
End: 2020-01-01

## 2020-01-01 DIAGNOSIS — N40.0 BPH (BENIGN PROSTATIC HYPERPLASIA): ICD-10-CM

## 2020-01-01 DIAGNOSIS — J44.1 CHRONIC OBSTRUCTIVE PULMONARY DISEASE WITH ACUTE EXACERBATION (H): ICD-10-CM

## 2020-01-01 DIAGNOSIS — G40.909 EPILEPSY (H): ICD-10-CM

## 2020-01-01 DIAGNOSIS — I10 ESSENTIAL HYPERTENSION: ICD-10-CM

## 2020-01-01 DIAGNOSIS — M62.81 GENERALIZED MUSCLE WEAKNESS: ICD-10-CM

## 2020-01-01 DIAGNOSIS — I73.9 PERIPHERAL ARTERIAL DISEASE (H): ICD-10-CM

## 2020-01-01 DIAGNOSIS — R62.7 FAILURE TO THRIVE IN ADULT: ICD-10-CM

## 2020-01-01 DIAGNOSIS — G40.909 NONINTRACTABLE EPILEPSY WITHOUT STATUS EPILEPTICUS, UNSPECIFIED EPILEPSY TYPE (H): ICD-10-CM

## 2020-01-01 DIAGNOSIS — R29.6 FALLS FREQUENTLY: ICD-10-CM

## 2020-01-01 RX ORDER — LISINOPRIL 40 MG/1
TABLET ORAL
Qty: 90 TABLET | Refills: 2 | Status: SHIPPED | OUTPATIENT
Start: 2020-01-01

## 2020-01-01 RX ORDER — TAMSULOSIN HYDROCHLORIDE 0.4 MG/1
0.4 CAPSULE ORAL
Qty: 90 CAPSULE | Refills: 0 | Status: SHIPPED | OUTPATIENT
Start: 2020-01-01

## 2020-01-01 RX ORDER — LEVETIRACETAM 500 MG/1
500 TABLET ORAL 2 TIMES DAILY
Qty: 180 TABLET | Refills: 3 | Status: SHIPPED | OUTPATIENT
Start: 2020-01-01

## 2020-01-01 ASSESSMENT — PATIENT HEALTH QUESTIONNAIRE - PHQ9: SUM OF ALL RESPONSES TO PHQ QUESTIONS 1-9: 13

## 2021-05-24 ENCOUNTER — RECORDS - HEALTHEAST (OUTPATIENT)
Dept: ADMINISTRATIVE | Facility: CLINIC | Age: 81
End: 2021-05-24

## 2021-05-25 ENCOUNTER — RECORDS - HEALTHEAST (OUTPATIENT)
Dept: ADMINISTRATIVE | Facility: CLINIC | Age: 81
End: 2021-05-25

## 2021-05-26 ASSESSMENT — PATIENT HEALTH QUESTIONNAIRE - PHQ9: SUM OF ALL RESPONSES TO PHQ QUESTIONS 1-9: 13

## 2021-05-27 ENCOUNTER — RECORDS - HEALTHEAST (OUTPATIENT)
Dept: ADMINISTRATIVE | Facility: CLINIC | Age: 81
End: 2021-05-27

## 2021-05-28 ENCOUNTER — RECORDS - HEALTHEAST (OUTPATIENT)
Dept: ADMINISTRATIVE | Facility: CLINIC | Age: 81
End: 2021-05-28

## 2021-05-28 NOTE — PROGRESS NOTES
Assessment/Plan:    1. Chronic cough  2. Chronic obstructive pulmonary disease with acute exacerbation (H)  Reviewed nature of COPD. Symptoms are most likely due to COPD exacerbation.  No hypoxia.  Chest x-ray shows some hyperinflation but otherwise negative without obvious infiltrate.  Given the severity and persistence of patient's symptoms, will prescribe Z-Guilherme and 5-day course of prednisone.  Due to patient's extensive smoking history along with chronic cough and weight loss over the last year, will obtain a chest CT.  Referral placed to pulmonology.  - XR Chest 2 Views  - Ambulatory referral to Pulmonology  - CT Chest Without Contrast; Future  - predniSONE (DELTASONE) 20 MG tablet; Take 20 mg by mouth daily for 5 days.  Dispense: 5 tablet; Refill: 0  - azithromycin (ZITHROMAX Z-GUILHERME) 250 MG tablet; Take 2 tablets (500 mg) on  Day 1,  followed by 1 tablet (250 mg) once daily on Days 2 through 5.  Dispense: 6 tablet; Refill: 0    3. Tobacco use disorder  I have counseled the patient for tobacco cessation and the follow up will occur  at the next visit.    Subjective:    Adrián Arita is a 78 year old male seen today for follow up of productive cough.  He is accompanied by a friend today.  Past medical history is significant for COPD and daily tobacco use, hyperlipidemia, hypertension, syncope, epilepsy, paraparesis.  He was seen in the urgency room on 4/19/2019 with concerns of a cough, nasal congestion and pressure.  Chest x-ray was completed at that time and was normal.  Patient was diagnosed with acute sinusitis and bronchitis; due to history of COPD, was treated with 10-day course of doxycycline and albuterol inhaler. Today, patient reports that cough has not improved since urgent care visit.  It does not seem to be getting worse necessarily but is persistent.  Cough is been very notable for over a month.  Reports that he has somewhat of a cough at baseline also.  He does not feel that doxycycline or  albuterol inhaler has been working.  He is coughing up white mucus and is wheezing throughout the day.  He still has some nasal congestion.  Coughing is keeping him up at night.  He has tried sleeping in a recliner and sitting up without relief.  He denies any fevers or chills.  No systemic symptoms.  He continues to smoke at least a pack per day.  Not interested in quitting at this time. Review of systems is as stated in HPI, and the remainder of the 10 system review is otherwise unremarkable.    Past Medical History, Family History, and Social History reviewed.    Past Surgical History:   Procedure Laterality Date     APPENDECTOMY       CHOLECYSTECTOMY       MUSCLE BIOPSY Right 3/11/2016    Procedure: RIGHT SEMIMEMBRANOUS MUSCLE BIOPSY (RIGHT THIGH);  Surgeon: Subhash York MD;  Location: [facility] [place];  Service:         Family History   Problem Relation Age of Onset     Cancer Mother      Diabetes Son         Past Medical History:   Diagnosis Date     Anemia     pernicious anemia     Balance problem      BPH (benign prostatic hyperplasia)      Cancer (H)     basal cell CA removed from multiple sites     COPD (chronic obstructive pulmonary disease) (H)      Hyperlipidemia      Hypertension      Leg weakness, bilateral      Seizures (H)     epilepsy with recurrent seizures, last seizure activity 5 yrs ago     Shortness of breath         Social History     Tobacco Use     Smoking status: Current Every Day Smoker     Packs/day: 1.00     Types: Cigarettes     Smokeless tobacco: Never Used   Substance Use Topics     Alcohol use: No     Drug use: No        Current Outpatient Medications   Medication Sig Dispense Refill     amLODIPine (NORVASC) 10 MG tablet TAKE 1 TABLET BY MOUTH DAILY. 90 tablet 3     aspirin 81 MG EC tablet Take 81 mg by mouth daily.       CHOLECALCIFEROL, VITAMIN D3, (VITAMIN D3 ORAL) Take 2,000 Units by mouth daily.        cyanocobalamin 1,000 mcg/mL injection Inject 1,000 mcg into the  shoulder, thigh, or buttocks every 30 (thirty) days.       docusate sodium 100 mg capsule Take 100 mg by mouth daily.       escitalopram oxalate (LEXAPRO) 5 MG tablet TAKE 1 TABLET BY MOUTH EVERY DAY (Patient taking differently: TAKE 1 TABLET BY MOUTH EVERY DAY - pt only doing a half a tab) 90 tablet 0     levETIRAcetam (KEPPRA) 500 MG tablet TAKE 1 TABLET BY MOUTH TWICE DAILY. 180 tablet 3     lisinopril (PRINIVIL,ZESTRIL) 40 MG tablet TAKE 1 TABLET BY MOUTH EVERY DAY 90 tablet 3     multivitamin with minerals (THERA-M) 9 mg iron-400 mcg Tab tablet Take 1 tablet by mouth daily.       polyethylene glycol (MIRALAX) 17 gram packet Take 17 g by mouth daily as needed.       senna (SENOKOT) 8.6 mg tablet Take 1 tablet by mouth 2 (two) times a day.        tamsulosin (FLOMAX) 0.4 mg Cp24 Take 1 capsule (0.4 mg total) by mouth daily. 90 capsule 4     azithromycin (ZITHROMAX Z-GUILHERME) 250 MG tablet Take 2 tablets (500 mg) on  Day 1,  followed by 1 tablet (250 mg) once daily on Days 2 through 5. 6 tablet 0     predniSONE (DELTASONE) 20 MG tablet Take 20 mg by mouth daily for 5 days. 5 tablet 0     No current facility-administered medications for this visit.           Objective:    Vitals:    05/01/19 1327   BP: 132/60   Patient Site: Left Arm   Patient Position: Sitting   Cuff Size: Adult Regular   Pulse: 78   Temp: 98.2  F (36.8  C)   SpO2: 97%   Weight: 156 lb 9.6 oz (71 kg)      Body mass index is 19.57 kg/m .       General Appearance:  Alert, afebrile, cooperative, no distress, appears stated age   HEENT:  Normal. No acute findings.    Neck: Supple, symmetrical, no adenopathy.   Lungs:   Mild wheezing throughout.  No inspiratory crackles noted.  Respirations unlabored.    Heart:  Regular rate and rhythm, S1, S2 normal, no murmur, rub or gallop   Extremities: Extremities normal.  No cyanosis or edema   Skin: Warm, dry.  Skin color, texture, turgor normal, no rashes or lesions         This note has been dictated using voice  recognition software. Any grammatical or context distortions are unintentional and inherent to the use of this software.

## 2021-05-29 ENCOUNTER — RECORDS - HEALTHEAST (OUTPATIENT)
Dept: ADMINISTRATIVE | Facility: CLINIC | Age: 81
End: 2021-05-29

## 2021-05-29 NOTE — PROGRESS NOTES
RESPIRATORY CARE NOTE     Patient Name: Adrián Arita  Today's Date: 6/4/2019     Complete PFT done. Pt performed tests with good effort, 2.5 mg Albuterol neb given. Test results meet ATS criteria except Pre & Post FVC, patient had a difficult time exhaling > 4 seconds with coughing. Patient refused to do more than 4 Post FVC maneuvers, he stated he was done.  Results scanned into epic. Pt left in no distress.       Esthela Barrera, LRT

## 2021-05-29 NOTE — TELEPHONE ENCOUNTER
Refill Approved    Rx renewed per Medication Renewal Policy. Medication was last renewed on 4/4/18.    Daly Tadeo, [facility] Triage/Med Refill 6/24/2019     Requested Prescriptions   Pending Prescriptions Disp Refills     tamsulosin (FLOMAX) 0.4 mg cap [Pharmacy Med Name: TAMSULOSIN 0.4MG CAPSULES] 90 capsule 0     Sig: TAKE 1 CAPSULE BY MOUTH EVERY DAY       Alfuzosin/Tamsulosin/Silodosin Refill Protocol  Passed - 6/23/2019 10:52 AM        Passed - PCP or prescribing provider visit in past 12 months       Last office visit with prescriber/PCP: 3/6/2019 Filiberto Leonard MD OR same dept: Visit date not found OR same specialty: 5/1/2019 Grace Moran, CNP  Last physical: 1/15/2019 Last MTM visit: Visit date not found   Next visit within 3 mo: Visit date not found  Next physical within 3 mo: Visit date not found  Prescriber OR PCP: Filiberto Leonard MD  Last diagnosis associated with med order: 1. BPH (benign prostatic hyperplasia)  - tamsulosin (FLOMAX) 0.4 mg cap [Pharmacy Med Name: TAMSULOSIN 0.4MG CAPSULES]; TAKE 1 CAPSULE BY MOUTH EVERY DAY  Dispense: 90 capsule; Refill: 0    If protocol passes may refill for 12 months if within 3 months of last provider visit (or a total of 15 months).

## 2021-05-29 NOTE — TELEPHONE ENCOUNTER
Armando called for results of his swallow study he had last week. Will send a message to Dr. Deluca. Also wanted to let Dr. Deluca know that he just went to the Dermatologist and they removed a Melanoma, from his shoulder.  Will update Dr. Deluca.

## 2021-05-29 NOTE — PROGRESS NOTES
"Assessment and Plan:Adrián Arita is a 78 y.o. M with a past medical history significant for epilepsy and balance problems who presents to clinic today for evaluation of his cough and dyspnea.  Despite smoking most of his life and having emphysema on his CT, he does not obstructive airflows to support a COPD diagnosis.  He may have a chronic smokers cough with bronchitis at most, but overall the prognosis of his lung condition is fairly good.  He does give some evidence of chronic aspiration however, and this may be the issue driving his cough, if not smoking.  I'd like him to have a swallow evaluation to ensure this is not behind his extreme weight loss.     1. Cough - smokers cough versus chronic aspiration.  Have asked him to have a video swallow test and an XR esophagram to be sure there is not a mechanical or neurologic reason for his cough while eating.    2. Tobacco abuse - he has tried smoking before and says he went psychotic from trying.  Overall he has a poor chance of quitting at this point, although I still encouraged him to consider it.  3. Weight loss - this could be anorexia from depression, he has lost his wife and son recently, and has chronic debilitation which makes him marginally independent at best.  He does not have lung cancer, nor even asbestosis despite decades of asbestos exposure per his description.  4. RTC prn      CCx: cough    HPI: Mr. ROXANNE Hansen is a 78 year old male who presents to discuss a presumed COPD diagnosis.  He thinks his lungs have not been well for a couple of years.  He has a daily, morning cough of clearish sputum.  He does get winded easily, and has to use a walk given serious issues with his balance.  He connects his cough issues to his balance, as sometimes he cough so hard he falls over.  He says he coughs after meals, or during eating, and sometimes food \"gets stuck\" when he tries to swallow it.  He doesn't have much of an appetite, and says he is struggling to " make his own meals as he lost his wife recently as well as his son.     He was tried on an inhaler which he has since quit since it didn't help.  He has smoked his entire life, since he was a kid.  He quit once for two weeks, but it drove him insane.      Of note he describes variably colored stools, sometimes dark.    PMH:  Past Medical History:   Diagnosis Date     Anemia     pernicious anemia     Balance problem      BPH (benign prostatic hyperplasia)      Cancer (H)     basal cell CA removed from multiple sites     COPD (chronic obstructive pulmonary disease) (H)      Hyperlipidemia      Hypertension      Leg weakness, bilateral      Seizures (H)     epilepsy with recurrent seizures, last seizure activity 5 yrs ago     Shortness of breath        PSH:  Past Surgical History:   Procedure Laterality Date     APPENDECTOMY       CHOLECYSTECTOMY       MUSCLE BIOPSY Right 3/11/2016    Procedure: RIGHT SEMIMEMBRANOUS MUSCLE BIOPSY (RIGHT THIGH);  Surgeon: Subhash York MD;  Location: [facility] [place];  Service:        SH:  Social History     Socioeconomic History     Marital status:      Spouse name: Not on file     Number of children: Not on file     Years of education: Not on file     Highest education level: Not on file   Occupational History     Not on file   Social Needs     Financial resource strain: Not on file     Food insecurity:     Worry: Not on file     Inability: Not on file     Transportation needs:     Medical: Not on file     Non-medical: Not on file   Tobacco Use     Smoking status: Current Every Day Smoker     Packs/day: 1.00     Types: Cigarettes     Smokeless tobacco: Never Used   Substance and Sexual Activity     Alcohol use: No     Drug use: No     Sexual activity: Not on file   Lifestyle     Physical activity:     Days per week: Not on file     Minutes per session: Not on file     Stress: Not on file   Relationships     Social connections:     Talks on phone: Not on file     Gets  together: Not on file     Attends Gnosticism service: Not on file     Active member of club or organization: Not on file     Attends meetings of clubs or organizations: Not on file     Relationship status: Not on file     Intimate partner violence:     Fear of current or ex partner: Not on file     Emotionally abused: Not on file     Physically abused: Not on file     Forced sexual activity: Not on file   Other Topics Concern     Not on file   Social History Narrative     Not on file       Family history:  Family History   Problem Relation Age of Onset     Cancer Mother      Diabetes Son      The family history was reviewed and is not pertinent to the chief complaint or HPI.    ROS:  Review of Systems - History obtained from the patient  General ROS: negative  Psychological ROS: negative  ENT ROS: negative  Allergy and Immunology ROS: negative  Endocrine ROS: negative  Respiratory ROS: positive for - cough and shortness of breath  negative for - cough, shortness of breath or sputum changes  Cardiovascular ROS: no chest pain or palpitations  Gastrointestinal ROS: no abdominal pain, change in bowel habits, or black or bloody stools  Genito-Urinary ROS: no dysuria, trouble voiding, or hematuria  Musculoskeletal ROS: negative  Neurological ROS: no TIA or stroke symptoms  Dermatological ROS: negative      Current Meds:  Current Outpatient Medications   Medication Sig Note     aspirin 81 MG EC tablet Take 81 mg by mouth daily.      CHOLECALCIFEROL, VITAMIN D3, (VITAMIN D3 ORAL) Take 2,000 Units by mouth daily.       cyanocobalamin 1,000 mcg/mL injection Inject 1,000 mcg into the shoulder, thigh, or buttocks every 30 (thirty) days. 3/11/2016: Next dose due 3-4th week of March 2016     levETIRAcetam (KEPPRA) 500 MG tablet TAKE 1 TABLET BY MOUTH TWICE DAILY.      lisinopril (PRINIVIL,ZESTRIL) 40 MG tablet TAKE 1 TABLET BY MOUTH EVERY DAY      multivitamin with minerals (THERA-M) 9 mg iron-400 mcg Tab tablet Take 1 tablet by  mouth daily.      polyethylene glycol (MIRALAX) 17 gram packet Take 17 g by mouth daily as needed.      senna (SENOKOT) 8.6 mg tablet Take 1 tablet by mouth 2 (two) times a day.       tamsulosin (FLOMAX) 0.4 mg Cp24 Take 1 capsule (0.4 mg total) by mouth daily.        Labs:  No results found for this or any previous visit (from the past 72 hour(s)).    I have personally reviewed all imaging and PFT data available pertinent to this visit.    Imaging studies:  EXAM: CT CHEST WO CONTRAST  LOCATION: [facility]  DATE/TIME: 5/13/2019 2:00 PM     INDICATION: Cough, persistent chronic cough, copd, weight loss.  COMPARISON: 05/23/2016.  TECHNIQUE: Helical images were obtained through the chest. Multiplanar reformats were obtained. Dose reduction techniques were used.  CONTRAST: None.     FINDINGS:   LUNGS AND PLEURA: Moderate emphysema. Stable 5 mm minor fissure nodule most consistent with an intrapulmonary lymph node, incidental (series 4, image 8). Minimal scarring. Minimal bronchiectasis. Negative pleural spaces.     MEDIASTINUM: No adenopathy. Moderate coronary calcifications. Nonaneurysmal aorta.     LIMITED UPPER ABDOMEN: Atherosclerosis. Incompletely seen colonic diverticulosis.     MUSCULOSKELETAL: Bony demineralization. Degenerative changes spine.     IMPRESSION:   CONCLUSION:      1.  No suspicious nodularity or adenopathy.     2.  Minimal basilar scarring and bronchiectasis.     3.  Atherosclerosis.       PFTs:  FEV1/FVC is 70 and is normal.  FEV1 is 82% predicted and is normal.  FVC is 85% predicted and is normal.  There was no improvement in spirometry after a single inhaled dose of bronchodilator.  TLC is 96% predicted and is normal.  RV is 156% predicted and is increased.  DLCO is 69% predicted and is reduced when it   is corrected for hemoglobin.  The flow volume loop is normal Yes.     Impression:  Full Pulmonary Function Test is abnormal. Spirometry is consistent with mild diffusion capacity  "defect.  There is hyperinflation.     Physical Exam:  /60   Pulse 65   Resp 12   Ht 6' 3\" (1.905 m)   Wt 155 lb (70.3 kg)   SpO2 98%   BMI 19.37 kg/m    General - flat, depressed affect.  Looks to be in poor health, thin.  Ears/Mouth - OP pink moist, no thrush  Neck - no cervical lymphadenopathy  Lungs - Clear to ausculation bilaterally   CVS - regular rhythm with no murmurs, rubs or gallups  Abdomen - soft, NT, ND, NABS  Ext - no cyanosis, clubbing or edema  Skin - multiple healing bruises on arms.  Psychology - alert and oriented, answers appropriate        Electronically signed by:    Isai Deluca MD PhD  [facility]  "

## 2021-05-29 NOTE — PATIENT INSTRUCTIONS - HE
1) I think you have bronchitis, likely from smoking.  Its a chronic bronchitis, and it doesn't surprise me the inhalers don't really work.  Stopping smoking is the main thing.  2) You don't technically have COPD.  You do have emphysema though, a precursor to COPD.  3) I am a little worried about your swallowing.  I want to get a couple of tests to be sure your swallowing is safe and there isn't anything getting in the way of food getting down.  If you do choke on food at times, this can make your cough worse too.  4) I'll follow up with you over the phone on the swallow tests, if they are abnormal, we will send you to the right folks.

## 2021-05-29 NOTE — PROGRESS NOTES
"Speech Language/Pathology  Videofluoroscopic Swallow Study     Problem:  Patient Active Problem List   Diagnosis     Simple chronic bronchitis (H)     Benign Adenomatous Polyp Of The Large Intestine     Hyperlipidemia     Pernicious Anemia     Essential Hypertension     Benign Prostatic Hypertrophy     Chest Pain     Lower Back Pain     Epilepsy And Recurrent Seizures     Conjunctivitis     Ecchymosis     Paraparesis (Lower Extremities)     Unspecified hereditary and idiopathic peripheral neuropathy     Syncope and collapse     Tobacco use disorder     Smokers' cough (H)     Esophageal dysphagia       Onset Date: 6/11/2019 (order date)  Reason for Evaluation: Assess for dysphagia  Pertinent History: As above. Chronic cough; weight loss; balance issues; epilepsy  Current Diet: Regular textures and thin liquids  Baseline Diet: Regular textures and thin liquids    Patient is a 78-year-old male referred due to concerns of dysphagia with possible aspiration. Per Dr. Iqbal's recent clinic note, \"Mr. Arita is a 78 year old male who presents to discuss a presumed COPD diagnosis. He thinks his lungs have not been well for a couple of years. He has a daily, morning cough of clearish sputum. He does get winded easily, and has to use a walker given serious issues with his balance. He connects his cough issues to his balance, as sometimes he cough so hard he falls over. He says he coughs after meals, or during eating, and sometimes food \"gets stuck\" when he tries to swallow it. He doesn't have much of an appetite, and says he is struggling to make his own meals as he lost his wife recently as well as his son.\" A chest CT on 5/13/19 showed moderate emphysema and minimal basilar scarring and bronchiectasis, but no suspicious nodularity or adenopathy. The purpose of this study is to evaluate the physiology and function of patient's oropharyngeal swallow, determine his aspiration risk, and establish safe swallowing precautions " as appropriate. Patient also participated in an esophagram during this visit. Please refer to dedicated report for details.    Patient presents as alert and cooperative during this evaluation. He arrived to study alone.  An  was not applicable.    Patient was given nectar-thick, thin, honey-thick, and pureed consistencies of barium.    Oral Phase:    Dentition/Oral hygiene: Patient wears upper and lower dentures. He reports that these are loose-fitting, which likely coincides with his weight loss. Oral hygiene was adequate.    Oral motor function was not impaired.     Bolus prep and oral control were not impaired.     Anterior-Posterior transit was not impaired.    Premature spill beyond the base of the tongue into the valleculae did not occur with any consistency.     Tongue base retraction was moderately impaired.    Mild oral stasis occurred along the tongue base with thin, nectar-thick, and honey-thick liquid. This cleared with spontaneous dry swallows.    Pharyngeal Phase:    Aspiration did not occur with any consistency during this study.    Patient experienced intermittent laryngeal penetration after the swallow with thin liquid, nectar-thick liquid, and honey-thick liquid. This appeared related to stasis in the pyriform sinuses. Penetration was quite deep at times (i.e., at/near the vocal folds), and did not always clear spontaneously. Penetrated material did clear when patient was cued to clear his throat and then complete a dry swallow.     Swallow response was timely with all consistencies.    The epiglottis was very difficult to visualize and its movement difficult to discern.    Pharyngeal stasis was noted in the pyriform sinuses following boluses of each consistency. This was mild-moderate with thin, nectar-thick, and honey-thick liquid, and minimal with puree. As mentioned previously, patient experienced intermittent kickback laryngeal penetration from pyriform sinus stasis. Stasis  partially cleared when patient was cued to clear his throat and then complete a dry swallow.    Pharyngeal constriction was not impaired.    Hyolaryngeal elevation was not impaired. Hyolaryngeal excursion was mildly reduced.    Cricopharyngeal function was moderately impaired. The cricopharyngeus does not appear to fully open/relax, which seems to contribute to stasis in the pyriform sinuses. Patient is noted to have anterior cervical osteophytes at C5, C6, and C7. These may also be limiting cricopharyngeal opening. Cervical esophageal function was not impaired.    Assessment:    Patient demonstrated no significant oral dysphagia and mild pharyngeal dysphagia.    Patient is at mild aspiration risk with thin, nectar-thick, and honey-thick liquids due to reduced cricopharyngeal opening.    Recommendations:    Plan: Continue current diet of Regular textures and thin liquids    Strategies: Patient to follow standard safe swallowing precautions, including sitting fully upright for all intake, eating slowly, and taking one small bite or sip at a time. After each bite/sip, patient to complete a throat clear, followed by a dry swallow. This is to help clear stasis in the pyriform sinuses, thereby reducing aspiration risk.    Speech Therapy is not recommended at this time    Referrals: Patient may benefit from upper esophageal sphincter (UES) dilatation for suspected cricopharyngeal dysfunction. Consider referral to GI. If patient does undergo UES dilatation, recommend a repeat Videofluoroscopic Swallow Study to assess for any improvement in cricopharyngeal opening.    Reviewed reason for referral with patient and verbally explained roles of SLP and radiologist. Verbally explained process of VFSS prior to administration of barium. Verbally explained results and recommendations to patient. SLP answered questions and stated patient's referring provider, Dr. Isai Fagan, will be notified of results and have access to this  report in the EMR. Patient verbalized understanding.    25 dysphagia minutes    Grace Carr MA, CCC-SLP

## 2021-05-30 VITALS — BODY MASS INDEX: 21.5 KG/M2 | WEIGHT: 172 LBS

## 2021-05-30 NOTE — TELEPHONE ENCOUNTER
Pulmonary Telephone Note    Mr. Arita called, upset that he had not been notified of his test results from June 20. I have never met him, but as the pulmonologist in clinic today, I responded to his call and reviewed the results with him by telephone. He has ongoing cough, and continues to smoke. He had a VFSS showing mild pharyngeal dysphagia, and was given swallowing recommendations by SLP, but no restriction on textures of food or liquids. He had an esophagram showing presbyesophagus. Previously had EGD in February showing normal esophagus and small hiatal hernia.    I advised him to quit smoking, as his cough will not improve if he continues to smoke. I advised follow-up with his pulmonologist at [facility] regarding the presbyesophagus, and to follow the swallowing recommendations given by SLP for his dysphagia to minimize aspiration risk. He is in agreement. Encouraged him to call any time with questions or concerning symptoms, and apologized for the delay in him receiving his test results.    Filiberto Saunders MD  Pulmonary and Critical Care Medicine  [facility]  Cell [phone]  Office [phone]  Pager [phone]

## 2021-05-31 VITALS — BODY MASS INDEX: 19.87 KG/M2 | WEIGHT: 159 LBS

## 2021-06-01 VITALS — HEIGHT: 75 IN | BODY MASS INDEX: 19.77 KG/M2 | WEIGHT: 159 LBS

## 2021-06-02 ENCOUNTER — RECORDS - HEALTHEAST (OUTPATIENT)
Dept: ADMINISTRATIVE | Facility: CLINIC | Age: 81
End: 2021-06-02

## 2021-06-02 VITALS — WEIGHT: 161 LBS | HEIGHT: 75 IN | BODY MASS INDEX: 20.02 KG/M2

## 2021-06-02 VITALS — BODY MASS INDEX: 20 KG/M2 | WEIGHT: 160 LBS

## 2021-06-02 VITALS — BODY MASS INDEX: 19.89 KG/M2 | HEIGHT: 75 IN | WEIGHT: 160 LBS

## 2021-06-02 VITALS — BODY MASS INDEX: 20.12 KG/M2 | WEIGHT: 161 LBS

## 2021-06-02 VITALS — BODY MASS INDEX: 19.27 KG/M2 | HEIGHT: 75 IN | WEIGHT: 155 LBS

## 2021-06-02 VITALS — BODY MASS INDEX: 19.57 KG/M2 | WEIGHT: 156.6 LBS

## 2021-06-02 NOTE — TELEPHONE ENCOUNTER
Who is calling:  DONNY Lantigua from [facility]  Reason for Call:  Caller stated she would like to know more information on the patient. Caller stated she did the nursing evaluation for the patient yesterday and needs some medical information. Caller stated she is not sure who the provider is, that ordered home care for the patient. There are no recent notes or documentation of this. Please call Rhina back if the provider is aware and is following the patient's home care orders.  Date of last appointment with primary care: 5/1/19  Okay to leave a detailed message: Yes  [phone]

## 2021-06-02 NOTE — TELEPHONE ENCOUNTER
Patient Returning Call  Reason for call:  Returning clinic call.  Information relayed to patient:  Rhina, the caller was given the verbal order to proceed with orders requested per Dr Arreaga.  Patient has additional questions:  No  If YES, what are your questions/concerns:  NA  Okay to leave a detailed message?: No call back needed

## 2021-06-02 NOTE — TELEPHONE ENCOUNTER
Left message to call back for: orders request  Information to relay to patient:  Please notify ok for requested order per Dr. Leonard.    Unable to leave a detailed message without a detailed voicemail greeting.

## 2021-06-02 NOTE — TELEPHONE ENCOUNTER
Orders being requested: PT  1 x week x1  2 x week     Reason service is needed/diagnosis:   Weakness, fall prevention   When are orders needed by:  TODAY   Where to send Orders: [id number] - [facility]  Okay to leave detailed message?  Yes

## 2021-06-02 NOTE — TELEPHONE ENCOUNTER
Reason contacted:  Orders request  Information relayed:  Notified Dr. Leonard will follow for home care services.     Skilled nursing 1 time for 1 week (last week he was seen once)   PT 2 weeks X 2 ,1 week X 1    Speech 2 weeks X 1, 1 Week X 2   HHA once a week for 5 weeks, 1 PRN visit for discharge       Gave verbal ok per Dr. Leonard.   Additional questions:  No  Further follow-up needed:  No  Okay to leave a detailed message:  No

## 2021-06-02 NOTE — TELEPHONE ENCOUNTER
Who is calling:  [facility]  Reason for Call:  Would the clinic please refax the Face to Face, and the plan of care.  Home care had an issue with the fax machine and they did not come over.  Date of last appointment with primary care: 10/16/19  Okay to leave a detailed message: Yes    FaX # [phone]

## 2021-06-02 NOTE — PROGRESS NOTES
Assessment/Plan:    1. Leg weakness, bilateral  Bilateral leg weakness.  Patient seen with his family friend Nara Manning who used to date patient's son.  Face-to-face encounter completed for home skilled nursing, physical therapy, occupational therapy etc.  Lab monitoring to be completed today as well.  - Ambulatory referral to Home Health  - Comprehensive Metabolic Panel  - HM2(CBC w/o Differential)  - Thyroid Stimulating Hormone (TSH)  - CK Total    2. Falls frequently  Frequent falls associate with peripheral arterial disease and leg weakness.  Benefits of smoking cessation reviewed.  Declines smoking cessation effort.  - Ambulatory referral to Home Health    3. Peripheral arterial disease (H)  As above, peripheral arterial disease present.  Ongoing smoking unfortunately.  Patient declines smoking cessation effort.    4. Chronic obstructive pulmonary disease with acute exacerbation (H)  COPD, stable.    5. Nonintractable epilepsy without status epilepticus, unspecified epilepsy type (H)  History of seizure disorder however no seizures in over Keppra 500 mg twice daily currently.  Med monitoring completed.  - Comprehensive Metabolic Panel  - HM2(CBC w/o Differential)    6. Essential hypertension  Hypertension fair control.  Lab monitoring.  Lisinopril 40 mg daily to continue.  - Comprehensive Metabolic Panel  - Thyroid Stimulating Hormone (TSH)    7. Major depressive disorder, recurrent episode, moderate (H)  Underlying depression.  In remission.  Patient declines further intervention.  PHQ 9 questionnaire 0 out of 27.      I have had an Advance Directives discussion with the patient.         Subjective:    Adrián DEL Arita is seen today for leg weakness.  Frequent falls.  Has services currently including skilled nursing and physical therapy etc. at home and needs a face-to-face encounter to have covered services.  Peripheral arterial disease.  Ongoing smoking unfortunately.  Noted COPD.  Treated for  hypertension.  History of depression.  Denies recent illness.  Unable to get around without assistance.  Using a 2 wheeled walker typically which is wife used to use before she passed away.  Patient has advanced directives completed.  Has prepared his own .  Comprehensive review of systems as above otherwise all negative.     (10/10/17) - Vanessa x 89 (she  of lung CA)  1 son (Juwan) -  (18) at age 53 - diabetes, OxyContin and EtOH use, frequent falls, etc.  Tobacco: 1 ppd  EtOH: none  Mom - living (will be 103 in ) - at [facility] 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 Martinez    Past Surgical History:   Procedure Laterality Date     APPENDECTOMY       CHOLECYSTECTOMY       MUSCLE BIOPSY Right 3/11/2016    Procedure: RIGHT SEMIMEMBRANOUS MUSCLE BIOPSY (RIGHT THIGH);  Surgeon: Subhash York MD;  Location: [facility];  Service:         Family History   Problem Relation Age of Onset     Cancer Mother      Diabetes Son         Past Medical History:   Diagnosis Date     Anemia     pernicious anemia     Balance problem      BPH (benign prostatic hyperplasia)      Cancer (H)     basal cell CA removed from multiple sites     COPD (chronic obstructive pulmonary disease) (H)      Hyperlipidemia      Hypertension      Leg weakness, bilateral      Seizures (H)     epilepsy with recurrent seizures, last seizure activity 5 yrs ago     Shortness of breath         Social History     Tobacco Use     Smoking status: Current Every Day Smoker     Packs/day: 1.00     Types: Cigarettes     Smokeless tobacco: Never Used   Substance Use Topics     Alcohol use: No     Drug use: No        Current Outpatient Medications   Medication Sig  Dispense Refill     aspirin 81 MG EC tablet Take 81 mg by mouth daily.       CHOLECALCIFEROL, VITAMIN D3, (VITAMIN D3 ORAL) Take 2,000 Units by mouth daily.        levETIRAcetam (KEPPRA) 500 MG tablet TAKE 1 TABLET BY MOUTH TWICE DAILY. 180 tablet 3     lisinopril (PRINIVIL,ZESTRIL) 40 MG tablet TAKE 1 TABLET BY MOUTH EVERY DAY 90 tablet 3     multivitamin with minerals (THERA-M) 9 mg iron-400 mcg Tab tablet Take 1 tablet by mouth daily.       polyethylene glycol (MIRALAX) 17 gram packet Take 17 g by mouth daily as needed.       senna (SENOKOT) 8.6 mg tablet Take 1 tablet by mouth 2 (two) times a day.        tamsulosin (FLOMAX) 0.4 mg cap TAKE 1 CAPSULE BY MOUTH EVERY DAY 90 capsule 2     cyanocobalamin 1,000 mcg/mL injection Inject 1,000 mcg into the shoulder, thigh, or buttocks every 30 (thirty) days.       No current facility-administered medications for this visit.           Objective:    Vitals:    10/16/19 1526 10/16/19 1530   BP: 160/72 140/70   Pulse: 68    SpO2: 96%    Weight: 151 lb (68.5 kg)       Body mass index is 18.87 kg/m .    Alert.  Cantankerous.  Chest with diminished breath sounds.  Cardiac exam regular.  Sitting with legs crossed.  Peripheral arterial disease with decreased peripheral pulses identified without peripheral edema.  No rash.      This note has been dictated using voice recognition software and as a result may contain minor grammatical errors and unintended word substitutions.

## 2021-06-02 NOTE — TELEPHONE ENCOUNTER
Who is calling:  [facility]  Reason for Call:  I am calling to request the office note from 10/16/19 and the order to discontinue speech therapy to be faxed to [phone].  I had made this request prior but our fax machine was not working.    Date of last appointment with primary care:  10/16/19  Has the patient been recently seen:  Yes  Okay to leave a detailed message: Yes

## 2021-06-02 NOTE — TELEPHONE ENCOUNTER
Who is calling: Rhina RN, [facility]  Reason for Call:      Rhina is calling to ask a few follow-up questions about patient.     Reviewed patient's instructions from swallow study to eat slowly, chew well follow each bit with a sip and then a dry swallow to help delayed swallow.     Rhina asked if patient has a DNR or DNI, no advanced directive found since 2015.     Patient needs a follow-up appointment with in the past 30 days for Home Health. Rhina will help to set-up.     In addition, patient reported a fall on 10/2/19 where patient lost his balance and fell back onto couch on his glasses, when getting up, no injuries and did not break his glasses.  Date of last appointment with primary care: 5/1/19  Okay to leave a detailed message: Yes, [phone]

## 2021-06-02 NOTE — TELEPHONE ENCOUNTER
Can you follow for home care orders? I am unaware who placed an order for home care for this patient. Care Everywhere was reviewed.

## 2021-06-02 NOTE — TELEPHONE ENCOUNTER
RN triage call  Ronit  Nurse from [facility] is calling to report:  Ronit received a message from the physical therapist who was out on Tuesday 10/29 to see patient in his home.  Patient reported to PT that on Nolberto 10/27 he had a fall. He slipped backwards on his back door steps while wearing socks. He stated he hit the back of his head and R shoulder. The pain at that time was rated 4/10  No further complaints of headache at this time.  Patient only notified his brother at the time of the fall.    Home care nurse would like to know of any orders or recommendations from PCP.  Please review and advise. Thank you  Jenny Lugo RN  Care Connection Triage Nurse  2:50 PM  10/31/2019

## 2021-06-02 NOTE — TELEPHONE ENCOUNTER
FYI - Status Update  Who is Calling: Home Care  Update: Speech Therapy attempted to see patient, the patient refused services.  Okay to leave a detailed message?:  No return call needed

## 2021-06-03 VITALS
HEART RATE: 68 BPM | WEIGHT: 151 LBS | SYSTOLIC BLOOD PRESSURE: 140 MMHG | OXYGEN SATURATION: 96 % | DIASTOLIC BLOOD PRESSURE: 70 MMHG | BODY MASS INDEX: 18.87 KG/M2

## 2021-06-03 NOTE — TELEPHONE ENCOUNTER
FYI - Status Update  Who is Calling: Home Care  Update: Physical therapy and home health aid services have be discontinued   Okay to leave a detailed message?:  Yes

## 2021-06-03 NOTE — TELEPHONE ENCOUNTER
Provider Communication  Who is calling:  Yoni from [facility]   Facility in which provider is associated:  [facility]   Reason for call:  Waiting on face to face encounter form   Urgency for return call:  as available  Okay to leave detailed message?:  Yes

## 2021-06-04 NOTE — TELEPHONE ENCOUNTER
Requesting new verbal order for Skilled Nursing start of care assessment to be completed on 12/20 per  Patient's request     This is outside of the original 48 hour referral, requiring a new verbal order.     Thank you

## 2021-06-04 NOTE — TELEPHONE ENCOUNTER
Reason contacted:  Orders request  Information relayed:      Spoke with Za who is a close family friend. She is calling on patients behalf requesting orders for home care       PT and OT due to frequent falls and bilateral leg weakness.  Needs help with ADL's including bathing, laundry etc.    Skilled nursing for complex medication set up    Patient has frequent falls and per Za is developing dementia.  Za has been helping patient but is unable to do so as she recently had surgery and is requiring help of her own with ADL's.    Per Za patient is declining an appointment with Dr. Leonard.      Please place order for home care if appropriate.   Please see 10/16/2019 OV note (this was a face to face for home care services)    Additional questions:  No  Further follow-up needed:  Yes  Okay to leave a detailed message:  Yes

## 2021-06-04 NOTE — TELEPHONE ENCOUNTER
Tano Leonard,    Patient has declined homecare until after the holidays. Referral will be discarded at this time. Patient was informed to reach out to you when he is ready for home care and will need a new referral.       Thank you.    DONNY Rivers  Clinical Coordinator  [facility]  [phone]

## 2021-06-04 NOTE — TELEPHONE ENCOUNTER
Who is calling:  Yoni  Reason for Call:  Caller is calling to follow up on the status of the face to face encounter form listed below. Caller stated that this was faxed on 10/31 and 12/10 to fax number [phone]. Caller would like a call back to confirm the status.  Okay to leave a detailed message: Yes    [phone]

## 2021-06-05 NOTE — TELEPHONE ENCOUNTER
Refill Approved    Rx renewed per Medication Renewal Policy. Medication was last renewed on 1/28/19.    Geetha Rand, [facility] Connection Triage/Med Refill 1/26/2020     Requested Prescriptions   Pending Prescriptions Disp Refills     levETIRAcetam (KEPPRA) 500 MG tablet [Pharmacy Med Name: LEVETIRACETAM 500MG TABLETS] 180 tablet 3     Sig: TAKE 1 TABLET BY MOUTH TWICE DAILY       Gabapentin/Levetiracetam/Tiagabine Refill Protocol  Passed - 1/26/2020 10:26 AM        Passed - PCP or prescribing provider visit in past 12 months or next 3 months     Last office visit with prescriber/PCP: 10/16/2019 Filiberto Leonard MD OR same dept: 10/16/2019 Filiberto Leonard MD OR same specialty: 10/16/2019 Filiberto Leonard MD  Last physical: 1/15/2019 Last MTM visit: Visit date not found   Next visit within 3 mo: Visit date not found  Next physical within 3 mo: Visit date not found  Prescriber OR PCP: Filiberto Leonard MD  Last diagnosis associated with med order: 1. Epilepsy (H)  - levETIRAcetam (KEPPRA) 500 MG tablet [Pharmacy Med Name: LEVETIRACETAM 500MG TABLETS]; TAKE 1 TABLET BY MOUTH TWICE DAILY.  Dispense: 180 tablet; Refill: 3    If protocol passes may refill for 12 months if within 3 months of last provider visit (or a total of 15 months).

## 2021-06-06 NOTE — TELEPHONE ENCOUNTER
Refill Approved    Rx renewed per Medication Renewal Policy. Medication was last renewed on 3/10/19.    Daly Tadeo, [facility] Connection Triage/Med Refill 3/12/2020     Requested Prescriptions   Pending Prescriptions Disp Refills     lisinopriL (PRINIVIL,ZESTRIL) 40 MG tablet [Pharmacy Med Name: LISINOPRIL 40MG TABLETS] 90 tablet 3     Sig: TAKE 1 TABLET BY MOUTH EVERY DAY       Ace Inhibitors Refill Protocol Passed - 3/8/2020 11:19 AM        Passed - PCP or prescribing provider visit in past 12 months       Last office visit with prescriber/PCP: 10/16/2019 Filiberto Leonard MD OR same dept: 10/16/2019 Filiberto Leonard MD OR same specialty: 10/16/2019 Filiberto Leonard MD  Last physical: 1/15/2019 Last MTM visit: Visit date not found   Next visit within 3 mo: Visit date not found  Next physical within 3 mo: Visit date not found  Prescriber OR PCP: Filiberto Leonard MD  Last diagnosis associated with med order: 1. Essential hypertension  - lisinopriL (PRINIVIL,ZESTRIL) 40 MG tablet [Pharmacy Med Name: LISINOPRIL 40MG TABLETS]; TAKE 1 TABLET BY MOUTH EVERY DAY  Dispense: 90 tablet; Refill: 3    If protocol passes may refill for 12 months if within 3 months of last provider visit (or a total of 15 months).             Passed - Serum Potassium in past 12 months     Lab Results   Component Value Date    Potassium 3.6 10/16/2019             Passed - Blood pressure filed in past 12 months     BP Readings from Last 1 Encounters:   10/16/19 140/70             Passed - Serum Creatinine in past 12 months     Creatinine   Date Value Ref Range Status   10/16/2019 0.74 0.70 - 1.30 mg/dL Final

## 2021-06-07 NOTE — TELEPHONE ENCOUNTER
Refill Approved    Rx renewed per Medication Renewal Policy. Medication was last renewed on 6/24/19.    Daly Tdaeo, [facility] Connection Triage/Med Refill 3/24/2020     Requested Prescriptions   Pending Prescriptions Disp Refills     tamsulosin (FLOMAX) 0.4 mg cap [Pharmacy Med Name: TAMSULOSIN 0.4MG CAPSULES] 90 capsule 2     Sig: TAKE 1 CAPSULE BY MOUTH EVERY DAY       Alfuzosin/Tamsulosin/Silodosin Refill Protocol  Passed - 3/23/2020  9:30 AM        Passed - PCP or prescribing provider visit in past 12 months       Last office visit with prescriber/PCP: 10/16/2019 Filiberto Leonard MD OR same dept: Visit date not found OR same specialty: 10/16/2019 Filiberto Leonard MD  Last physical: 1/15/2019 Last MTM visit: Visit date not found   Next visit within 3 mo: Visit date not found  Next physical within 3 mo: Visit date not found  Prescriber OR PCP: Filiberto Leonard MD  Last diagnosis associated with med order: 1. BPH (benign prostatic hyperplasia)  - tamsulosin (FLOMAX) 0.4 mg cap [Pharmacy Med Name: TAMSULOSIN 0.4MG CAPSULES]; TAKE 1 CAPSULE BY MOUTH EVERY DAY  Dispense: 90 capsule; Refill: 2    If protocol passes may refill for 12 months if within 3 months of last provider visit (or a total of 15 months).

## 2021-06-09 ENCOUNTER — RECORDS - HEALTHEAST (OUTPATIENT)
Dept: ADMINISTRATIVE | Facility: CLINIC | Age: 81
End: 2021-06-09

## 2021-06-10 NOTE — PROGRESS NOTES
Assessment:    1. Chronic Obstructive Pulmonary Disease     2. Hyperlipidemia, unspecified hyperlipidemia type  Lipid Cascade   3. Epilepsy And Recurrent Seizures  Basic Metabolic Panel   4. Unspecified hereditary and idiopathic peripheral neuropathy     5. Pernicious anemia  Vitamin B12    HM2(CBC w/o Differential)   6. External hemorrhoids  Ambulatory referral to Colorectal Surgery   7. Constipation  Ambulatory referral to Colorectal Surgery         Plan:    Check vitamin B12 currently on vitamin B12 sublingual 1000  g daily.  Check basic metabolic panel for medication monitoring with underlying hypertension, seizure disorder, etc.  Check lipid cascade and fasting otherwise should determine whether or not he needs to resume simvastatin 20 mg at bedtime.  Did refer patient to colorectal specialist regarding external hemorrhoid management as well as concerns with constipation.  Should follow-up with Dr. [facility] regarding seizure disorder, dizziness and peripheral neuropathy issues.  Follow-up in this office no later than 6 months.        Subjective:    Adrián MATHIS Juan J is seen today for follow-up evaluation.  Multiple concerns.  Underlying hypertension.  Continues lisinopril 40 mg daily and amlodipine 10 mg daily.  History of B12 deficiency.  Switch from monthly B12 shots to sublingual vitamin B12 1000  g daily.  Tolerates well.  Has not used simvastatin in months perhaps longer.  Did have a donut earlier this morning but feels like closer to 8 hours since eating.  Continues to smoke unfortunately.  Followed by Dr. Alvarado with history of seizure disorder continues Keppra 500 mg twice daily.  Describes external hemorrhoids.  Constipation concerns.  Would like further evaluation for this.  Recent colonoscopy August 1, 2016 with tubular adenoma otherwise does not require further colorectal cancer screening based on age.  Bilateral peripheral neuropathy lower extremities and weakness with  difficulty getting out of chair.     - Vanessa > 40 years  1 son - Juwan (diabetes, etc.)  Tobacco: 1 ppd  EtOH: none  Mom - living (will be 103 in ) - at [facility] 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 Martinez    Past Surgical History:   Procedure Laterality Date     APPENDECTOMY       CHOLECYSTECTOMY       MUSCLE BIOPSY Right 3/11/2016    Procedure: RIGHT SEMIMEMBRANOUS MUSCLE BIOPSY (RIGHT THIGH);  Surgeon: Subhash York MD;  Location: [facility];  Service:         No family history on file.     Past Medical History:   Diagnosis Date     Anemia     pernicious anemia     Balance problem      BPH (benign prostatic hyperplasia)      Cancer     basal cell CA removed from multiple sites     COPD (chronic obstructive pulmonary disease)      Hyperlipidemia      Hypertension      Leg weakness, bilateral      Seizures     epilepsy with recurrent seizures, last seizure activity 5 yrs ago     Shortness of breath         Social History   Substance Use Topics     Smoking status: Current Every Day Smoker     Packs/day: 1.00     Types: Cigarettes     Smokeless tobacco: Never Used     Alcohol use No        Current Outpatient Prescriptions   Medication Sig Dispense Refill     amLODIPine (NORVASC) 10 MG tablet TAKE 1 TABLET BY MOUTH EVERY DAY 90 tablet 2     aspirin 81 MG EC tablet Take 81 mg by mouth daily.       CHOLECALCIFEROL, VITAMIN D3, (VITAMIN D3 ORAL) Take 2,000 Units by mouth daily.        cyanocobalamin 1,000 mcg/mL injection Inject 1,000 mcg into the shoulder, thigh, or buttocks every 30 (thirty) days.       docusate sodium 100 mg capsule Take 100 mg by mouth daily.       HYDROcodone-acetaminophen (NORCO) 5-325 mg per tablet Take 1-2  tablets by mouth every 4 (four) hours as needed for pain. 40 tablet 0     levETIRAcetam (KEPPRA) 500 MG tablet TAKE 1 TABLET BY MOUTH TWO TIMES A  tablet 1     lisinopril (PRINIVIL,ZESTRIL) 40 MG tablet TAKE 1 TABLET BY MOUTH EVERY DAY 90 tablet 3     polyethylene glycol (MIRALAX) 17 gram packet Take 17 g by mouth daily as needed.       senna (SENOKOT) 8.6 mg tablet Take 1 tablet by mouth 2 (two) times a day.        simvastatin (ZOCOR) 20 MG tablet Take 20 mg by mouth daily.       tamsulosin (FLOMAX) 0.4 mg Cp24 TAKE 1 CAPSULE BY MOUTH EVERY DAY 90 capsule 0     No current facility-administered medications for this visit.           Objective:    Vitals:    05/04/17 1412   BP: 150/70   Pulse: 66   SpO2: 98%   Weight: 172 lb (78 kg)      Body mass index is 21.5 kg/(m^2).    Alert.  Transfers slowly.  Chest with diminished breath sounds.  Smell of tobacco use.  Cardiac exam regular.  Abdomen benign.  Extremities warm and dry otherwise decreased peripheral pulses noted.  No edema.

## 2021-06-11 ENCOUNTER — RECORDS - HEALTHEAST (OUTPATIENT)
Dept: FAMILY MEDICINE | Facility: CLINIC | Age: 81
End: 2021-06-11

## 2021-06-11 NOTE — TELEPHONE ENCOUNTER
Friend Za null,   Family is concerned about pt safety in home & want to look into home care if possible  No consent on file, staff at office told her they would send consent forms to his house   Relayed below message to pt.  He refuses to go to ED as advised per earlier note  Daly Kee RN  [place] Nurse Advisor     [facility]

## 2021-06-11 NOTE — TELEPHONE ENCOUNTER
Sophie (patient's sister in law) calling requesting to cancel appointment for today. No consent to communicate on file. Per [facility] appointment has been cancelled. See telephone notes from 9/22/20.    Sophie stating patient was upset that police had arrived yesterday to check on patient. Stating she is not currently with patient.  Sophie is requesting to know if there is anything further they can do, or a way to obtain home care with out orders?  Requesting message to PCP.    Patient is not present with caller to triage.     Brionna Angulo, DONNY  [facility]        Reason for Disposition    [1] Caller requesting NON-URGENT health information AND [2] PCP's office is the best resource    Additional Information    Negative: [1] Caller is not with the adult (patient) AND [2] reporting urgent symptoms    Negative: Lab result questions    Negative: Medication questions    Negative: Caller can't be reached by phone    Negative: Caller has already spoken to PCP or another triager    Negative: RN needs further essential information from caller in order to complete triage    Negative: Requesting regular office appointment    Protocols used: INFORMATION ONLY CALL-A-

## 2021-06-11 NOTE — TELEPHONE ENCOUNTER
Spoke with patient at length.  Patient states he will not be able to attend office visit tomorrow.  States unable to walk or drive.  States refusal to come even if family member willing to drive him.  Refuses to go to the emergency department if worsening symptoms stating that it takes a day and a half to be seen there and he does not appreciate the inconvenience.  I have instructed patient in order to receive home health services needs face-to-face visit for assessment before completion etc. otherwise would recommend ER assessment for disposition with recommendation for assisted living however patient declines adamantly.

## 2021-06-11 NOTE — TELEPHONE ENCOUNTER
Based on the description I would recommend emergency department also.  It is up to the patient if they would like to wait for Dr. Leonard's response.

## 2021-06-11 NOTE — TELEPHONE ENCOUNTER
Update from [facility] police -     Their overall impression is that pt is not in great shape.  With mobility being a problem for the patient.  His vitals were stable.  Bruising was noted on both arms due to frequent falls.  Pt was able to communicate effectively and groomed appropriately.  Pt is refusing medical care.

## 2021-06-11 NOTE — TELEPHONE ENCOUNTER
"Triage call:   Sister in law is the caller- states she is to with the patient- no consent on file- provided basic triage advice.     She is concerned that patient needs services in his home.   - son and wife both passed away in 2017  - increased depression  - doesn't want food delivered  - patient states to his brother that he wants to die  - reports that her  went to talk to him yesterday and patient told him he is ready for services either home care or assisted living.   Reports frequent falls and crawling to get around the home   Sophie states that he has not said he will do anything to harm himself directly    Advised that if there is a concern for his well being that they should call 911 and have patient evaluated. Sophie is hesitant to do this as Armando is very \"crabby\" and doesn't want to upset him. She states that she and her  will go to patients home today and call back to do a full triage and help him schedule an appointment with his PCP to get services started       Ev Peña RN BS[facility] Triage/Med Refill 9/21/2020 8:33 AM  Reason for Disposition    Patient wants to be seen     Will have patient call back to do a complete triage    Additional Information    Negative: Patient attempted suicide    Negative: Patient is threatening suicide now    Negative: Violent behavior, or threatening to physically hurt or kill someone    Negative: Patient is very confused (disoriented, slurred speech) and no other adult (e.g., friend or family member) available    Negative: Difficult to awaken or acting very confused (disoriented, slurred speech) and new onset    Negative: Sounds like a life-threatening emergency to the triager    Negative: Alcohol use, abuse or dependence, question or problem related to    Negative: Drug abuse or dependence, question or problem related to    Negative: Suicide thoughts, threats, attempts, or questions    Negative: Anxiety is main problem or symptom    Negative: " Depression and unable to do any of normal activities (e.g., self care, school, work; in comparison to baseline).    Negative: Very strange or confused behavior    Negative: Patient sounds very sick or weak to the triager    Negative: Fever > 101 F (38.3 C)    Negative: Sometimes has thoughts of suicide    Negative: Symptoms interfere with work or school    Negative: Depression is worsening (e.g.,sleeping poorly, less able to do activities of daily living)    Negative: Significant weight loss (> 10 pounds or 5 kg) and not dieting    Negative: Alcohol or drug abuse, known or suspected    Negative: New or changed psychiatric medications > 2 weeks ago and not feeling any better    Negative: Requesting to talk with a counselor (mental health worker, psychiatrist, etc.)    Protocols used: DEPRESSION-A-OH

## 2021-06-11 NOTE — TELEPHONE ENCOUNTER
RN Triage:  Return call from Sister-in-law, Sophie,  who is now with the patient.  See previous triage note.    Spoke with 80 year old Armando, who also gives permission to speak with Sophie or his brother Otto regarding medical concerns.    Pt reports:    Has gradually been falling more in the home over the past several months.    3 days ago fell and slipped on the carpeting upstairs.  Pt states he fell backwards and hit R side of his back/ribs, and the back of his head on furniture.    Ribs are still sore; rates pain a 5 on a 0-10 pain scale.     Hurts when coughs or takes a deep breath.    Back of neck and head are sore.    Says he is covered in bruises from falling and bumping furniture over the past few months.    Uses a walker to make his food and needs help with walking to get outside and down some steps.    Friends and family bring food to house.    Unable to go upstairs now and has been sleeping on the main floor.    Not able to take care of self at this point and has limited mobility.    Has lost a lot of weight.    Not able to drive.    Family requesting evaluation if possible for other services, home care or hospice.    PLAN:  Advised ED per protocol.  Pt declines ED as it is such a long wait and also declines OV because it is too hard to walk, go down steps and get into a car.  Will consult with PCP per family request for evaluations for other home care services, or other instructions.  Please advise.  Family aware PCP is out of the office until tomorrow 9/22/20.  Advised to call 911 if severe breathing problems, unable to stand, coughing or spitting up blood.   Advised to call back if new symptoms or questions arise.    Sharmaine Perry RN   [facility] RN Triage    Additional Information    Negative: Major injury from dangerous force or speed (e.g., MVA, fall > 10 feet or 3 meters)    Negative: Bullet wound, knife wound or other penetrating object    Negative: Puncture wound that sounds life-threatening  to the triager    Negative: SEVERE difficulty breathing (e.g., struggling for each breath, speaks in single words)    Negative: Major bleeding (actively dripping or spurting) that can't be stopped    Negative: Open wound of the chest with sound of moving air (sucking wound) or visible air bubbles    Negative: Shock suspected (e.g., cold/pale/clammy skin, too weak to stand, low BP, rapid pulse)    Negative: Coughing or spitting up blood    Negative: Bluish (or gray) lips or face    Negative: Unconscious or was unconscious    Negative: Sounds like a life-threatening emergency to the triager    Negative: Chest pain not from an injury    Negative: Wound looks infected    Negative: SEVERE chest pain    Negative: Difficulty breathing and not severe    Negative: Skin is split open or gaping (or length > 1/2 inch or 12 mm)    Negative: Bleeding won't stop after 10 minutes of direct pressure (using correct technique)    Negative: Sounds like a serious injury to the triager    Can't take a deep breath but no respiratory distress (e.g., hurts to take a deep breath)    Protocols used: CHEST INJURY-A-OH

## 2021-06-11 NOTE — TELEPHONE ENCOUNTER
Patient is refusing ER. Should we arrange an office visit?   A consent to communicate was mailed to patient yesterday.

## 2021-06-11 NOTE — TELEPHONE ENCOUNTER
Clinic following with subsequent encouter- closing this encounter    Ev Peña RN [facility] Triage/Med Refill 9/23/2020 7:39 AM

## 2021-06-11 NOTE — TELEPHONE ENCOUNTER
Two calls from family and family friend -     Concerns with frequent falls every day    can't walk    Not eating    Unable to care for self    Concerns for broken bones and in lot of pain    Concerned he may try to take his life if he is forced to leave his home.    Call to pt - talked with pt. Regarding health - pt states he is close to dealth.  He states he can not walk or drive, he does express that he does not feel safe.        Tried to make an appt with dr. Leonard tomorrow.  Pt declines due to current health.    Dr. Leonard updated and is wanting a welfare check done today.   With hopes of transporting pt to ER for evaluation.    911 - called   Systolic CHF

## 2021-06-12 NOTE — TELEPHONE ENCOUNTER
Refill Approved    Rx renewed per Medication Renewal Policy. Medication was last renewed on 1/26/20.    Daly Tadeo, [facility] Connection Triage/Med Refill 10/9/2020     Requested Prescriptions   Pending Prescriptions Disp Refills     levETIRAcetam (KEPPRA) 500 MG tablet 180 tablet 2     Sig: Take 1 tablet (500 mg total) by mouth 2 (two) times a day.       Gabapentin/Levetiracetam/Tiagabine Refill Protocol  Passed - 10/8/2020  3:07 PM        Passed - PCP or prescribing provider visit in past 12 months or next 3 months     Last office visit with prescriber/PCP: 10/16/2019 Filiberto Leoanrd MD OR same dept: 10/16/2019 Filiberto Leonard MD OR same specialty: 10/16/2019 Filiberto Leonard MD  Last physical: 1/15/2019 Last MTM visit: Visit date not found   Next visit within 3 mo: Visit date not found  Next physical within 3 mo: Visit date not found  Prescriber OR PCP: Filiberto Leonard MD  Last diagnosis associated with med order: 1. Epilepsy (H)  - levETIRAcetam (KEPPRA) 500 MG tablet; Take 1 tablet (500 mg total) by mouth 2 (two) times a day.  Dispense: 180 tablet; Refill: 2    If protocol passes may refill for 12 months if within 3 months of last provider visit (or a total of 15 months).

## 2021-06-12 NOTE — TELEPHONE ENCOUNTER
Who is calling:  Cecilia with [facility]  Reason for Call:    Cecilia states patient is refusing home care at this time.  Patient is very depressed and will not let anyone talk to him.  Cecilia states his  is going to his home and try to talk to him.  Please reach out to Cecilia if Provider has more questions.  Date of last appointment with primary care: 10/9/2020  Okay to leave a detailed message: Yes    Please fax updated medication list to Cecilia at: [phone].

## 2021-06-12 NOTE — PROGRESS NOTES
"Adrián Arita is a 80 y.o. male who is being evaluated via a billable video visit.      The patient has been notified of following:     \"This video visit will be conducted via a call between you and your physician/provider. We have found that certain health care needs can be provided without the need for an in-person physical exam.  This service lets us provide the care you need with a video conversation.  If a prescription is necessary we can send it directly to your pharmacy.  If lab work is needed we can place an order for that and you can then stop by our lab to have the test done at a later time.    Video visits are billed at different rates depending on your insurance coverage. Please reach out to your insurance provider with any questions.    If during the course of the call the physician/provider feels a video visit is not appropriate, you will not be charged for this service.\"    Patient has given verbal consent to a Video visit? Yes  How would you like to obtain your AVS? AVS Preference: Mail a copy.  If dropped by the video visit, the video invitation should be sent to: Text to cell phone: [phone]  Will anyone else be joining your video visit? Yes: Friend Za. How would they like to receive their invitation? Text to cell phone: [phone]        Video Start Time: 2:58 PM    Additional provider notes: GENERAL: pale, frail, elderly, fatigued and appears older than stated age  EYES: Eyes grossly normal to inspection. No discharge or erythema, or obvious scleral/conjunctival abnormalities.  RESP: No audible wheeze, cough, or visible cyanosis.  No visible retractions or increased work of breathing.    NEURO: Cranial nerves grossly intact. Mentation and speech appropriate for age.  PSYCH: affect flat and appearance disheveled    Video visit completed today.  Patient is with a friend Nara available at [phone].  Concern for patient's progressive decline in health.  Unable to walk without risk of " significant falls.  Patient has been resistant to further assessment outside of his house.  He does not want to come to the clinic.  Declines nursing home or ER assessment.  Needs meal support.  Needs assistance with transportation.  Does agree to home health care.  Does have peripheral arterial disease a smoking history.  History of epilepsy.  History of hypertension.  His feet have been swelling significantly recently.  Continues to decline with weight loss.  Denies diarrhea.  No chest pain.  Comprehensive review of systems as above otherwise all negative.    1. Failure to thrive in adult  Failure to thrive in adult.  Home health care eval and treat with orders provided.  Please contact patient's friend Nara at [phone] in order to assist with arranging services.  Face-to-face encounter was completed today with video visit.    2. Generalized muscle weakness  Generalized muscle weakness multifactorial.  Unclear etiology with component of peripheral arterial disease involving the legs causing weakness and risk of falls.    3. Peripheral arterial disease (H)  Smoking cessation and to continue.    4. Chronic obstructive pulmonary disease with acute exacerbation (H)  History of COPD.  Failure to thrive.  Risk of underlying cancer etc. exist.  Patient declines further assessment at this time.  Did discuss consideration for possible hospice or palliative care services.    5. Falls frequently  As above.  Falls prevention.  Use a walker etc.  Now residing in [place] level of his house therefore not having to utilize stairs.    6. Essential hypertension  Continue hypertension management.  Vital signs to be checked by home health nurse.    7. Nonintractable epilepsy without status epilepticus, unspecified epilepsy type (H)  History of epilepsy.  No recent seizures described.           Video-Visit Details    Type of service:  Video Visit    Video End Time (time video stopped): 3:21 PM  Originating Location (pt. Location):  Home    Distant Location (provider location):  [facility]     Platform used for Video Visit: Kejni Leonard MD

## 2021-06-12 NOTE — TELEPHONE ENCOUNTER
Who is calling:  Za Mujica,the patient's friend.  Reason for Call:  Caller states the patient did not take several of his medications for a few days because he was out of the medications. Caller states she requested the refill the medications this morning. Caller states the patient cannot live by himself or needs a Home health nurse. Writer read the telephone note per Filiberto Leonard MD from 9/22/20 to the caller. Caller states the patient is not eating and looks like a skeleton. Caller states the patient had stated he wants to die in his house and not be bothered. Caller states the patient is close to death and has nothing to live for.Caller states the patient is bitter, angry and yells at the caller. Writer was not able to get off of the phone after 18 minutes on the call with the caller, she was talking constantly. Caller would like a call.  Date of last appointment with primary care:   Okay to leave a detailed message: Yes  [phone]

## 2021-06-12 NOTE — TELEPHONE ENCOUNTER
I am uncertain what we can do for this patient since he declines an appointment with Dr. Leonard and refuses ER evaluation.

## 2021-06-12 NOTE — TELEPHONE ENCOUNTER
Medication Request  Medication name: Oxycodone-acetaminophen 5-325 mg, 1/4 tablet at bedtime.      [facility]     Reason for request: Patient has a history of multiple rib fractures on 6/30/20.  Patient continues to have mild discomfort when he lays down to sleep.  He uses this medication to get comfortable at night.  Patient fell again on 9/21/20.  Police were sent to patient's home to do a welfare check on him that date but he refused medical care.  He is in great pain when he walks or lays down in bed to sleep.   Armando is willing to have a telephone visit but it is too hard to get him into a vehicle to get into the clinic for an evaluation.      When did you use medication last?:  July    Patient offered appointment:  patient declined     Okay to leave a detailed message: yes    r

## 2021-06-12 NOTE — TELEPHONE ENCOUNTER
1. Orders being requested: Skilled Nursing 1 time a week for 8 weeks.   Reason service is needed/diagnosis: Medication management   When are orders needed by: as soon as possible   Where to send Orders: Phone:  [phone]  Okay to leave detailed message?  Yes      2. Orders being requested: Physical Therapy evaluation and treat  Reason service is needed/diagnosis: Mobility   When are orders needed by: as soon as possible   Where to send Orders: Phone:  [phone]  Okay to leave detailed message?  Yes      3. Orders being requested: Occupational Therapy evaluation and treat  Reason service is needed/diagnosis:  Assistance with devices   When are orders needed by: as soon as possible   Where to send Orders: Phone:  [phone]  Okay to leave detailed message?  Yes

## 2021-06-12 NOTE — TELEPHONE ENCOUNTER
Reason contacted:  Orders request  Information relayed:  Notified ok for requested order per Dr. Leonard.   Additional questions:  No  Further follow-up needed:  No  Okay to leave a detailed message:  No

## 2021-06-12 NOTE — TELEPHONE ENCOUNTER
Dr. Leonard can you review this when you return to clinic?   This patient has not been seen since 10/16/2019 therefore it is unlikely we can place an order for hospice without a face to face office visit as an update regarding patients health status will be needed.    He declines to come in and be seen.       I am uncertain what course of action we can take for this patient currently.

## 2021-06-12 NOTE — TELEPHONE ENCOUNTER
Due to be seen    Rx renewed per Medication Renewal Policy. Medication was last renewed on 3/24/20.    Daly Tadeo, [facility] Triage/Med Refill 10/7/2020     Requested Prescriptions   Pending Prescriptions Disp Refills     tamsulosin (FLOMAX) 0.4 mg cap  0     Sig: Take by mouth daily.       Alfuzosin/Tamsulosin/Silodosin Refill Protocol  Passed - 10/7/2020 10:39 AM        Passed - PCP or prescribing provider visit in past 12 months       Last office visit with prescriber/PCP: 10/16/2019 Filiberto Leonard MD OR same dept: 10/16/2019 Filiberto Leonard MD OR same specialty: 10/16/2019 Filiberto Leonard MD  Last physical: 1/15/2019 Last MTM visit: Visit date not found   Next visit within 3 mo: Visit date not found  Next physical within 3 mo: Visit date not found  Prescriber OR PCP: Filiberto Leonard MD  Last diagnosis associated with med order: 1. BPH (benign prostatic hyperplasia)  - tamsulosin (FLOMAX) 0.4 mg cap; Take by mouth daily.; Refill: 0    If protocol passes may refill for 12 months if within 3 months of last provider visit (or a total of 15 months).

## 2021-06-12 NOTE — TELEPHONE ENCOUNTER
Who is calling:  Za  Reason for Call:  Could a telephone visit be done with Armando to see about a referral for home care?    Date of last appointment with primary care:   10/16/19    Okay to leave a detailed message: Yes.  Please let Za know

## 2021-06-12 NOTE — TELEPHONE ENCOUNTER
We received a home care referral for this patient and due to our capacity, this referral was vended to [facility] nursing services. Thank you!

## 2021-06-12 NOTE — TELEPHONE ENCOUNTER
Orders being requested:    visit and evaluation.  Reason service is needed/diagnosis: Nursing recommending  When are orders needed by: ASAP  Where to send Orders: Phone:  [phone]  Okay to leave detailed message?  Yes

## 2021-06-12 NOTE — TELEPHONE ENCOUNTER
Left message to call back for: orders request  Information to relay to patient:  Notified ok for requested order per Dr. Leonard via VM.

## 2021-06-15 NOTE — PROGRESS NOTES
The Clinic Care Guide called the patient  today at the request of the PCP to discuss possible clinic care coordination enrollment. Clinic care coordination was described to the patient and immediate needs were discussed. The patient declined enrollment in clinic care coordination at this time. The patient was provided with contact information for the clinic care guide if their needs changed.      Order #: [id number] Procedure: AMB REFERRAL TO CARE MANAGEMENT (PRIMARY CARE) Order Type Outpatient Referral   Order Date: 1/18/2018 Proc Category: Outpatient Referral Orderables     Priority: Routine Class: Internal Referral     Standing Status: Normal Status: Sent [2]     Ordering User: Valentino Deleon MD Department: [facility]/ob     Auth Provider: VALENTINO DELEON Enc Provider: Valentino Deleon MD     Diagnosis: Frequent falls

## 2021-06-16 PROBLEM — J41.0 SMOKERS' COUGH (H): Status: ACTIVE | Noted: 2019-06-10

## 2021-06-16 PROBLEM — R13.19 ESOPHAGEAL DYSPHAGIA: Status: ACTIVE | Noted: 2019-06-10

## 2021-06-18 NOTE — LETTER
Letter by Filiberto Leonard MD at      Author: Filiberto Leonard MD Service: -- Author Type: --    Filed:  Encounter Date: 1/16/2019 Status: (Other)       Adrián Arita  [address]  [place] [address]             January 16, 2019         Dear Mr. Arita,    Below are the results from your recent visit:    Resulted Orders   Thyroid Stimulating Hormone (TSH)   Result Value Ref Range    TSH 0.55 0.30 - 5.00 uIU/mL   Comprehensive Metabolic Panel   Result Value Ref Range    Sodium 141 136 - 145 mmol/L    Potassium 4.3 3.5 - 5.0 mmol/L    Chloride 106 98 - 107 mmol/L    CO2 27 22 - 31 mmol/L    Anion Gap, Calculation 8 5 - 18 mmol/L    Glucose 103 70 - 125 mg/dL    BUN 9 8 - 28 mg/dL    Creatinine 0.71 0.70 - 1.30 mg/dL    GFR MDRD Af Amer >60 >60 mL/min/1.73m2    GFR MDRD Non Af Amer >60 >60 mL/min/1.73m2    Bilirubin, Total 0.7 0.0 - 1.0 mg/dL    Calcium 9.9 8.5 - 10.5 mg/dL    Protein, Total 7.1 6.0 - 8.0 g/dL    Albumin 3.8 3.5 - 5.0 g/dL    Alkaline Phosphatase 68 45 - 120 U/L    AST 11 0 - 40 U/L    ALT 10 0 - 45 U/L    Narrative    Fasting Glucose reference range is 70-99 mg/dL per  American Diabetes Association (ADA) guidelines.   HM2(CBC w/o Differential)   Result Value Ref Range    WBC 4.3 4.0 - 11.0 thou/uL    RBC 4.72 4.40 - 6.20 mill/uL    Hemoglobin 14.9 14.0 - 18.0 g/dL    Hematocrit 45.2 40.0 - 54.0 %    MCV 96 80 - 100 fL    MCH 31.5 27.0 - 34.0 pg    MCHC 32.9 32.0 - 36.0 g/dL    RDW 12.3 11.0 - 14.5 %    Platelets 196 140 - 440 thou/uL    MPV 7.4 7.0 - 10.0 fL   CK Total   Result Value Ref Range    CK, Total 47 30 - 190 U/L       Your thyroid screening test (i.e. TSH) was normal.     Your kidney and liver tests were normal.     Your complete blood count results were normal.  No evidence for anemia, etc.     Your muscle enzyme test (i.e. CK level) was normal.     Continue your current medication as discussed.     Please call with questions or contact us using  [employer].    Sincerely,        Electronically signed by Filiberto Leonard MD

## 2021-06-19 NOTE — LETTER
Letter by Isai Deluca MD at      Author: Isai Deluca MD Service: -- Author Type: --    Filed:  Encounter Date: 6/10/2019 Status: (Other)         Grace Moran, ELENA  [address] [address]  [facility] [address]                                  Makenna 10, 2019    Patient: Adrián Arita   MR Number: [id number]   YOB: 1940   Date of Visit: 6/10/2019     Dear Dr. Moran, ELENA:    Thank you for referring Adrián Arita to me for evaluation. Below are the relevant portions of my assessment and plan of care.    If you have questions, please do not hesitate to call me. I look forward to following Adrián along with you.    Sincerely,        Isai Deluca MD          CC  No Recipients  Isai Deluca MD  6/10/2019  4:13 PM  Sign at close encounter  Assessment and Plan:Adrián Arita is a 78 y.o. M with a past medical history significant for epilepsy and balance problems who presents to clinic today for evaluation of his cough and dyspnea.  Despite smoking most of his life and having emphysema on his CT, he does not obstructive airflows to support a COPD diagnosis.  He may have a chronic smokers cough with bronchitis at most, but overall the prognosis of his lung condition is fairly good.  He does give some evidence of chronic aspiration however, and this may be the issue driving his cough, if not smoking.  I'd like him to have a swallow evaluation to ensure this is not behind his extreme weight loss.     1. Cough - smokers cough versus chronic aspiration.  Have asked him to have a video swallow test and an XR esophagram to be sure there is not a mechanical or neurologic reason for his cough while eating.    2. Tobacco abuse - he has tried smoking before and says he went psychotic from trying.  Overall he has a poor chance of quitting at this point, although I still encouraged him to consider it.  3. Weight loss - this could be anorexia from depression, he has lost  "his wife and son recently, and has chronic debilitation which makes him marginally independent at best.  He does not have lung cancer, nor even asbestosis despite decades of asbestos exposure per his description.  4. RTC prn      CCx: cough    HPI: Mr. ROXANNE Hansen is a 78 year old male who presents to discuss a presumed COPD diagnosis.  He thinks his lungs have not been well for a couple of years.  He has a daily, morning cough of clearish sputum.  He does get winded easily, and has to use a walk given serious issues with his balance.  He connects his cough issues to his balance, as sometimes he cough so hard he falls over.  He says he coughs after meals, or during eating, and sometimes food \"gets stuck\" when he tries to swallow it.  He doesn't have much of an appetite, and says he is struggling to make his own meals as he lost his wife recently as well as his son.     He was tried on an inhaler which he has since quit since it didn't help.  He has smoked his entire life, since he was a kid.  He quit once for two weeks, but it drove him insane.      Of note he describes variably colored stools, sometimes dark.    PMH:  Past Medical History:   Diagnosis Date   ? Anemia     pernicious anemia   ? Balance problem    ? BPH (benign prostatic hyperplasia)    ? Cancer (H)     basal cell CA removed from multiple sites   ? COPD (chronic obstructive pulmonary disease) (H)    ? Hyperlipidemia    ? Hypertension    ? Leg weakness, bilateral    ? Seizures (H)     epilepsy with recurrent seizures, last seizure activity 5 yrs ago   ? Shortness of breath        PSH:  Past Surgical History:   Procedure Laterality Date   ? APPENDECTOMY     ? CHOLECYSTECTOMY     ? MUSCLE BIOPSY Right 3/11/2016    Procedure: RIGHT SEMIMEMBRANOUS MUSCLE BIOPSY (RIGHT THIGH);  Surgeon: Subhash York MD;  Location: [facility];  Service:        SH:  Social History     Socioeconomic History   ? Marital status:      Spouse name: Not on file   ? " Number of children: Not on file   ? Years of education: Not on file   ? Highest education level: Not on file   Occupational History   ? Not on file   Social Needs   ? Financial resource strain: Not on file   ? Food insecurity:     Worry: Not on file     Inability: Not on file   ? Transportation needs:     Medical: Not on file     Non-medical: Not on file   Tobacco Use   ? Smoking status: Current Every Day Smoker     Packs/day: 1.00     Types: Cigarettes   ? Smokeless tobacco: Never Used   Substance and Sexual Activity   ? Alcohol use: No   ? Drug use: No   ? Sexual activity: Not on file   Lifestyle   ? Physical activity:     Days per week: Not on file     Minutes per session: Not on file   ? Stress: Not on file   Relationships   ? Social connections:     Talks on phone: Not on file     Gets together: Not on file     Attends Gnosticism service: Not on file     Active member of club or organization: Not on file     Attends meetings of clubs or organizations: Not on file     Relationship status: Not on file   ? Intimate partner violence:     Fear of current or ex partner: Not on file     Emotionally abused: Not on file     Physically abused: Not on file     Forced sexual activity: Not on file   Other Topics Concern   ? Not on file   Social History Narrative   ? Not on file       Family history:  Family History   Problem Relation Age of Onset   ? Cancer Mother    ? Diabetes Son      The family history was reviewed and is not pertinent to the chief complaint or HPI.    ROS:  Review of Systems - History obtained from the patient  General ROS: negative  Psychological ROS: negative  ENT ROS: negative  Allergy and Immunology ROS: negative  Endocrine ROS: negative  Respiratory ROS: positive for - cough and shortness of breath  negative for - cough, shortness of breath or sputum changes  Cardiovascular ROS: no chest pain or palpitations  Gastrointestinal ROS: no abdominal pain, change in bowel habits, or black or bloody  stools  Genito-Urinary ROS: no dysuria, trouble voiding, or hematuria  Musculoskeletal ROS: negative  Neurological ROS: no TIA or stroke symptoms  Dermatological ROS: negative      Current Meds:  Current Outpatient Medications   Medication Sig Note   ? aspirin 81 MG EC tablet Take 81 mg by mouth daily.    ? CHOLECALCIFEROL, VITAMIN D3, (VITAMIN D3 ORAL) Take 2,000 Units by mouth daily.     ? cyanocobalamin 1,000 mcg/mL injection Inject 1,000 mcg into the shoulder, thigh, or buttocks every 30 (thirty) days. 3/11/2016: Next dose due 3-4th week of March 2016   ? levETIRAcetam (KEPPRA) 500 MG tablet TAKE 1 TABLET BY MOUTH TWICE DAILY.    ? lisinopril (PRINIVIL,ZESTRIL) 40 MG tablet TAKE 1 TABLET BY MOUTH EVERY DAY    ? multivitamin with minerals (THERA-M) 9 mg iron-400 mcg Tab tablet Take 1 tablet by mouth daily.    ? polyethylene glycol (MIRALAX) 17 gram packet Take 17 g by mouth daily as needed.    ? senna (SENOKOT) 8.6 mg tablet Take 1 tablet by mouth 2 (two) times a day.     ? tamsulosin (FLOMAX) 0.4 mg Cp24 Take 1 capsule (0.4 mg total) by mouth daily.        Labs:  No results found for this or any previous visit (from the past 72 hour(s)).    I have personally reviewed all imaging and PFT data available pertinent to this visit.    Imaging studies:  EXAM: CT CHEST WO CONTRAST  LOCATION: [facility]  DATE/TIME: 5/13/2019 2:00 PM     INDICATION: Cough, persistent chronic cough, copd, weight loss.  COMPARISON: 05/23/2016.  TECHNIQUE: Helical images were obtained through the chest. Multiplanar reformats were obtained. Dose reduction techniques were used.  CONTRAST: None.     FINDINGS:   LUNGS AND PLEURA: Moderate emphysema. Stable 5 mm minor fissure nodule most consistent with an intrapulmonary lymph node, incidental (series 4, image 8). Minimal scarring. Minimal bronchiectasis. Negative pleural spaces.     MEDIASTINUM: No adenopathy. Moderate coronary calcifications. Nonaneurysmal aorta.     LIMITED UPPER  "ABDOMEN: Atherosclerosis. Incompletely seen colonic diverticulosis.     MUSCULOSKELETAL: Bony demineralization. Degenerative changes spine.     IMPRESSION:   CONCLUSION:      1.  No suspicious nodularity or adenopathy.     2.  Minimal basilar scarring and bronchiectasis.     3.  Atherosclerosis.       PFTs:  FEV1/FVC is 70 and is normal.  FEV1 is 82% predicted and is normal.  FVC is 85% predicted and is normal.  There was no improvement in spirometry after a single inhaled dose of bronchodilator.  TLC is 96% predicted and is normal.  RV is 156% predicted and is increased.  DLCO is 69% predicted and is reduced when it   is corrected for hemoglobin.  The flow volume loop is normal Yes.     Impression:  Full Pulmonary Function Test is abnormal. Spirometry is consistent with mild diffusion capacity defect.  There is hyperinflation.     Physical Exam:  /60   Pulse 65   Resp 12   Ht 6' 3\" (1.905 m)   Wt 155 lb (70.3 kg)   SpO2 98%   BMI 19.37 kg/m     General - flat, depressed affect.  Looks to be in poor health, thin.  Ears/Mouth - OP pink moist, no thrush  Neck - no cervical lymphadenopathy  Lungs - Clear to ausculation bilaterally   CVS - regular rhythm with no murmurs, rubs or gallups  Abdomen - soft, NT, ND, NABS  Ext - no cyanosis, clubbing or edema  Skin - multiple healing bruises on arms.  Psychology - alert and oriented, answers appropriate        Electronically signed by:    Isai Deluca MD PhD  [facility]       "

## 2021-06-19 NOTE — PROGRESS NOTES
Assessment/Plan:      Diagnoses and all orders for this visit:    Lumbar spine pain    Facet arthropathy, lumbar    Myofascial pain    Myopathy  -     Ambulatory referral to Neurology    Proximal muscle weakness  -     Ambulatory referral to Neurology        Assessment: 77-year-old gentleman with a history of melanoma, COPD, hyperlipidemia hypertension, seizures with:    1.  Chronic lumbar spine pain over the past 2 years.  Left worse than right.  Most consistent with facet arthropathy.  Has degenerative changes in the lumbar spine and severe facet arthropathy most significant at L4-5 with fluid in the facets and moderate facet arthropathy at L5-S1.  Had improvement of the sharp stabbing pain in the lumbar spine with injection but quite painful for him.  2.  Worsening of his overall generalized weakness and fatigue after the steroid injection.  Weaknesses proximal more than distal consistent with myopathy. He has had an EMG in the past consistent with a diffuse myopathy.    3.  Myofascial pain in the lumbar spine.    Discussion:    1. *We discussed his response to injection.  He has had some improvement in the sharp pain but the injection was quite painful.  I do not believe he is a good candidate for further injections in lumbar spine given his breathing issues after the injection and he agrees.  2.  We discussed therapy as an option but difficult for him to attend appointments given his numerous social obligations with the death of his son and his wife within the past year or 2.  He has numerous legal/administrative duties.  3.  It is of utmost importance that he sees his neurologist.  I will refer him back to Dr. Portillo for evaluation of the progressive weakness and myopathy.  4.  He can follow-up with me after seeing neurology      It was our pleasure caring for your patient today, if there any questions or concerns please do not hesitate to contact us.      Subjective:   Patient ID: Adrián MATHIS Juan J is a 78  y.o. male.    History of Present Illness: *Patient presents for evaluation of low back pain and diffuse weakness.  Pain is at the lumbosacral junction left greater than right.  Since his last visit he has had facet injections after an MRI.  The facet injections were bilateral L4-5 and L5-S1.  This helped the sharp stabbing pain in the left lumbar spine which is worsening slightly lately but overall he is improved since his last visit mildly.    Unfortunately after the injection had diffuse weakness shaking and feels that it was very bad experience and does not want another injection.  Shortness of breath.  This was of laying on his stomach.  He has diffuse weakness and now is difficult for him to get out of a chair which is worsening.  Has been seen by neurology in the past.  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      Imaging: MRI lumbar spine is personally reviewed.  This is ordered at last visit.  Images and report personally reviewed.  This shows multilevel degenerative disc disease lumbar spine.  Severe facet arthropathy at L4-5 withFluid in the facet joints.  Narrowing of the lateral recess.  Contacting bilateral traversing L5 nerve roots in the lateral recess.  Moderate foraminal stenosis.  L5-S1 moderate facet arthropathy.    Review of Systems: He has diffuse weakness in the legs and arms.  Shortness of breath has improved but difficult getting out of a chair.  He does move slowly nearly hit by a skateboarder.  Slow reflexes.    Past Medical History:   Diagnosis Date     Anemia     pernicious anemia     Balance problem      BPH (benign prostatic hyperplasia)      Cancer (H)     basal cell CA removed from multiple sites     COPD (chronic obstructive pulmonary disease) (H)      Hyperlipidemia      Hypertension      Leg weakness, bilateral      Seizures (H)     epilepsy  with recurrent seizures, last seizure activity 5 yrs ago     Shortness of breath        The following portions of the patient's history were reviewed and updated as appropriate: allergies, current medications, past family history, past medical history, past social history, past surgical history and problem list.      Objective:   Physical Exam:    Vitals:    08/16/18 1344   BP: 145/70   Pulse: 64       General: Alert and oriented with normal affect. Attention, knowledge, memory, and language are intact. No acute distress.   Eyes: Sclerae are clear.  Respirations: Unlabored. CV: No lower extremity edema.   Gait:  Nonantalgic  Sensation is intact to light touch throughout the   lower extremities.  Reflexes are  . 1+ patellar and 0 Achilles      Manual muscle testing reveals:  Right /Left out of 5     3/3 hip flexors  4/4knee flexors  4/4 knee extensors  4/4 ankle dorsiflexors

## 2021-06-19 NOTE — PROGRESS NOTES
Assessment/Plan:      Diagnoses and all orders for this visit:    Lumbar spine pain  -     MR Lumbar Spine Without Contrast; Future; Expected date: 7/18/18  -     MR Lumbar Spine Without Contrast; Standing  -     MR Lumbar Spine Without Contrast    Arthropathy of lumbar facet joint  -     MR Lumbar Spine Without Contrast; Future; Expected date: 7/18/18  -     MR Lumbar Spine Without Contrast; Standing  -     MR Lumbar Spine Without Contrast    Lumbar spondylosis  -     MR Lumbar Spine Without Contrast; Future; Expected date: 7/18/18  -     MR Lumbar Spine Without Contrast; Standing  -     MR Lumbar Spine Without Contrast    Myofascial pain    Proximal muscle weakness  -     Ambulatory referral to Neurology    Myopathy  -     Ambulatory referral to Neurology        Assessment: 77-year-old gentleman with a history of melanoma, COPD, hyperlipidemia hypertension, seizures with:    1.  Chronic lumbar spine pain worsening over the past 2 years left greater than right.  Most consistent with mechanical pain/facet arthropathy.  He has degenerative changes in the lumbar spine and severe facet arthropathy most significant on the left at L5-S1 CT scan from 2 years ago.     2. myofascial pain lumbar spine.    3.  Bilateral lower extremity greater than upper extremity weakness in the proximal muscles consistent with myopathy.  He has had an EMG in the past consistent with a diffuse myopathy.  Question inclusion body myositis or other my the versus muscular dystrophy however lab work done in the past including CKHas been normal.       Discussion:    1.  I discussed the diagnosis and treatment options.  We discussed the options of further diagnostics along with further referrals..  At this time is been at least a couple of years since his last imaging study and I do not have any recent MRI imaging of the lumbar spine.  Like to evaluate for any significant central stenosis of the lumbar spine that would contribute to his leg  symptoms Along with his back pain as well as evaluate the extent/worsening of any spondylosis    2.  I will obtain MRI of lumbar spine to further evaluate.  3.  I highly recommend [facility] neurology for evaluation of the myopathy.  With his weight loss and persistent weakness that is progressing, he would like some answers for his weakness and neuromuscular clinic or neurology clinic at the [facility] may be helpful in distinguishing if this is a muscular dystrophy issue versus inclusion body myositis.  He has not been seen by neurology for the past couple of years.  4.  After his MRI is done we will contact him by phone.  Can consider facet injection along with some physical therapy will get imaging initially.      It was our pleasure caring for your patient today, if there any questions or concerns please do not hesitate to contact us.      Subjective:   Patient ID: Adrián Arita is a 77 y.o. male.    History of Present Illness: Patient presents at the request of Dr. Filiberto Leonard for an evaluation of low back pain along with a bilateral lower extremity paresthesias pain and weakness.  Patient has long-standing history of low back pain but really worse over the past couple of years.  He has numerous other issues as well including weight loss proximal weakness in his legs and arms poor balance and falls.  He has fallen occasionally.  He has had increase in back pain especially over the past couple of months but no new trauma.  He has pain over the left PSIS and lower lumbar spine and the paraspinals worse with any turning over in bed sitting nothing really makes it better.  It seems to improve on its own but then when he moves he has severe sharp pains.    He has pains in his legs as well bilateral lower extremities front and back with numbness and tingling but he also has weakness difficult getting up from a chair.  Has loss of balance.  Seems to use his arms when getting out of a chair.   This weakness is been persistent for several years.  He has been seen by neurology in the past notes reviewed.  EMG was done in the spine center in 2014 indicating a myopathy that was diffuse.  Subsequently was seen by neurology who felt that he did have a myopathy and that diagnosis was confirmed but he has no specific type of myopathy that was diagnosed after workup.      Imaging: Shows no high-grade spinal cord compression or high-grade central stenosisNo new imaging available.  MRI cervical spine from 2016.  CT scan of the abdomen and pelvis personally reviewed and discussed with the patient.  I reviewed this for evaluation of the spine itself.  Some multilevel degenerative disc disease with normal alignment.  He does have severe facet arthropathy most significant on the left at L5-S1 with severe facet hypertrophy.  There appears to be some central stenosis at L4-5 difficult to assess on the CT scan.    Labs: CK 2015 normal.  Recent conference of metabolic panel was normal with the exception of low creatinineAt 0.68 with normal being 0.7.    Review of Systems: Complains of numerous positive review of systems including changes in vision cough dizziness swelling of his feet shortness of breath anxiety, constipation, bruising easily, poor sleep related to pain, difficulty urinating, back pain joint pain leg pain depression swallowing difficulties heat and cold intolerance.  He reports significant weight loss over the past couple of years well over 50 pounds.  Remainder of 12 point review systems negative unless listed above.    Past Medical History:   Diagnosis Date     Anemia     pernicious anemia     Balance problem      BPH (benign prostatic hyperplasia)      Cancer (H)     basal cell CA removed from multiple sites     COPD (chronic obstructive pulmonary disease) (H)      Hyperlipidemia      Hypertension      Leg weakness, bilateral      Seizures (H)     epilepsy with recurrent seizures, last seizure activity 5  yrs ago     Shortness of breath      Recent melanoma removed.    Social history: Smokes.  Retired from the [employer].  Son and wife have recently passed away in the past 2 years.      The following portions of the patient's history were reviewed and updated as appropriate: allergies, current medications, past family history, past medical history, past social history, past surgical history and problem list.      WHO 5: 4    HARMAN Score: 68      Objective:   Physical Exam:    Vitals:    07/18/18 1235   BP: 132/60       General:  Well-appearing male in no acute distress.  Very thin.  Pleasant, cooperative, and interactive throughout the examination and interview.  CV: No lower extremity edema on inspection or paltation.  Lymphatics: No cervical lymphadenopathy palpated. Eyes: sclera clear. Skin: No rashes or lesions seen over the head/neck, hairline, arms, legs, trunk.  Respirations unlabored.  MSK: Gait is cautious, difficult to get out of a chair without using his arms.  Mild shuffling.. Refuses heel toe stand.  Ne increased sway on Romberg spine: normal increased thoracic kyphotic curve.  E.  F significantly limited range of motion lumbar spine all planes secondary to pain.  Extension not even to neutral as possible..  Palpation: Tenderness to palpation over left greater than right paraspinals at L4-5 and L5-S1. extremities: Full range of motion of the elbows, and wrists with no effusions or tenderness to palpation.   Full range of motion of the hips, knees, and ankles with no effusions or tenderness to palpation.   No hypermobility of the upper or lower extremities.  Neurologic exam: Mental status: Patient is alert and oriented with normal affect.  Attention, knowledge, memory, and language are intact.  Normal coordination throughout the examination.  Reflexes are 1+ and symmetric biceps, triceps, brachioradialis, patellar, and 0Achilles with equivocal toes and Negative Dena's.  Sensation is intact to light  touch throughout the upper and lower extremities bilaterally.  Manual muscle testing reveals 4-5 bilateral hip flexors knee flexors and extensors, 5/5 bilateral ankle plantar flexors, ankle  dorsiflexors, and EHL.  Upper extremities: 4/5 proximal muscle shoulder abductor's elbow flexors, 5/5 elbow extensors interosseous finger flexors wrist extensors.  Decreased muscle tone in the calves bilaterally.  Appears to have normal muscle bulk.

## 2021-06-19 NOTE — LETTER
Letter by Filiberto Leonard MD at      Author: Filiberto Leonard MD Service: -- Author Type: --    Filed:  Encounter Date: 10/17/2019 Status: Signed         Adrián Arita  [address]  [place] [address]             October 17, 2019         Dear Mr. Arita,    Below are the results from your recent visit:    Resulted Orders   Comprehensive Metabolic Panel   Result Value Ref Range    Sodium 140 136 - 145 mmol/L    Potassium 3.6 3.5 - 5.0 mmol/L    Chloride 105 98 - 107 mmol/L    CO2 25 22 - 31 mmol/L    Anion Gap, Calculation 10 5 - 18 mmol/L    Glucose 121 70 - 125 mg/dL    BUN 10 8 - 28 mg/dL    Creatinine 0.74 0.70 - 1.30 mg/dL    GFR MDRD Af Amer >60 >60 mL/min/1.73m2    GFR MDRD Non Af Amer >60 >60 mL/min/1.73m2    Bilirubin, Total 0.7 0.0 - 1.0 mg/dL    Calcium 9.7 8.5 - 10.5 mg/dL    Protein, Total 6.6 6.0 - 8.0 g/dL    Albumin 3.6 3.5 - 5.0 g/dL    Alkaline Phosphatase 64 45 - 120 U/L    AST 11 0 - 40 U/L    ALT 9 0 - 45 U/L    Narrative    Fasting Glucose reference range is 70-99 mg/dL per  American Diabetes Association (ADA) guidelines.   HM2(CBC w/o Differential)   Result Value Ref Range    WBC 4.4 4.0 - 11.0 thou/uL    RBC 4.71 4.40 - 6.20 mill/uL    Hemoglobin 15.0 14.0 - 18.0 g/dL    Hematocrit 44.5 40.0 - 54.0 %    MCV 95 80 - 100 fL    MCH 31.9 27.0 - 34.0 pg    MCHC 33.8 32.0 - 36.0 g/dL    RDW 12.0 11.0 - 14.5 %    Platelets 169 140 - 440 thou/uL    MPV 8.1 7.0 - 10.0 fL   Thyroid Stimulating Hormone (TSH)   Result Value Ref Range    TSH 0.36 0.30 - 5.00 uIU/mL   CK Total   Result Value Ref Range    CK, Total 58 30 - 190 U/L       Your kidney and liver tests were normal.     Your complete blood count results were normal.  No evidence for anemia, etc.     Your thyroid screening test (i.e. TSH) was normal.     Your muscle enzyme test (i.e. CK level) was normal.     Please call with questions or contact us using [employer].    Sincerely,        Electronically signed by Filiberto Leonard,  MD

## 2021-06-19 NOTE — LETTER
Letter by Grace Moran CNP at      Author: Grace Moran CNP Service: -- Author Type: --    Filed:  Encounter Date: 5/14/2019 Status: (Other)         Adrián Arita  [address]  [place] [address]             May 14, 2019         Dear Mr. Arita,    Below are the results from your recent visit:    Resulted Orders   CT Chest Without Contrast    Narrative    EXAM: CT CHEST WO CONTRAST  LOCATION: [facility]  DATE/TIME: 5/13/2019 2:00 PM    INDICATION: Cough, persistent chronic cough, copd, weight loss.  COMPARISON: 05/23/2016.  TECHNIQUE: Helical images were obtained through the chest. Multiplanar reformats were obtained. Dose reduction techniques were used.  CONTRAST: None.    FINDINGS:   LUNGS AND PLEURA: Moderate emphysema. Stable 5 mm minor fissure nodule most consistent with an intrapulmonary lymph node, incidental (series 4, image 8). Minimal scarring. Minimal bronchiectasis. Negative pleural spaces.    MEDIASTINUM: No adenopathy. Moderate coronary calcifications. Nonaneurysmal aorta.    LIMITED UPPER ABDOMEN: Atherosclerosis. Incompletely seen colonic diverticulosis.    MUSCULOSKELETAL: Bony demineralization. Degenerative changes spine.      Impression    CONCLUSION:     1.  No suspicious nodularity or adenopathy.    2.  Minimal basilar scarring and bronchiectasis.    3.  Atherosclerosis.             Your chest CT came back fairly normal overall.  I would recommend following up with pulmonology as we discussed.  Please let us know if you have any new or worsening symptoms in the meantime.    Please call with questions or contact us using [employer].    Sincerely,        Electronically signed by Grace Moran CNP

## 2021-06-19 NOTE — LETTER
Letter by Elliot Petty MD at      Author: Elliot Petty MD Service: -- Author Type: --    Filed:  Encounter Date: 5/21/2019 Status: (Other)         Adrián Arita  [address]  [place] [address]    May 21, 2019    Dear  Juan J,    Welcome to [facility]! Your appointment information is below.   Please bring the following to your appointment:    Insurance Card, so we may scan it for our records    Drivers license or valid ID, so we may scan it for our records    Co-pay (as applicable per your insurance plan)    A current list of your medications including over the counter products such as vitamins and supplements    Your medical records including copies of X-Ray films if you are transferring your care from another clinic.  If you do not have your records, please fill out the release of information form and we will request those records.     Provider: Elliot Petty MD  Appointment Date: June 11, 2019  Arrival Time: 2:30pm for Dr. Petty     Location: [facility]         [address]        [facility], [address]    **Please allow adequate time for your commute and parking. If you are more than 10 minutes late, you may be asked to reschedule.     If you need to cancel or reschedule your appointment, please notify us at least 24 hours prior to your appointment time so we are able to make this time available for another patient.    Thank you for choosing the [facility] for your health care needs. If you have any questions, please do not hesitate to contact us at any time at   [phone]. We look forward to caring for you.     Sincerely,     [facility] staff

## 2021-06-22 NOTE — PROGRESS NOTES
Assessment/Plan:    1. Peripheral arterial disease (H)  Noted myopathy followed by Dr. Ricardo Hillman.  Also, likely component of peripheral arterial disease contributing to lower extremity weakness.  Patient declines referral to [facility].  Declines labs including CK level.  Remains off simvastatin.  Declines referral to vascular surgeon for further evaluation and angiogram imaging.  Smoking cessation efforts to continue.  Falls prevention discussed.  Recommendation to ambulate with use of walker.    2. Leg weakness, bilateral  As above.    3. Essential hypertension  Hypertension, stable with blood pressure 134/60.  May continue amlodipine 10 mg daily and lisinopril 40 mg daily without current concern for medication recall.    4. Cough  Cough associated with smoking unchanged.  Declines chest x-ray.    5. Major depressive disorder, recurrent episode, moderate (H)  Does agree underlying depression with PHQ 9 questionnaire 12 out of 27 and MEHUL 7 questionnaire 13 out of 21.  Multiple drug interactions with Keppra however will utilize low-dose escitalopram 5 mg daily and reassess at scheduled physical January 30, 2019 to ensure desired improvement in depression and symptoms.  - escitalopram oxalate (LEXAPRO) 5 MG tablet; Take 1 tablet (5 mg total) by mouth daily.  Dispense: 30 tablet; Refill: 2          Subjective:    Adrián MATHIS AllisonTyrone is seen today for recent health concerns.  Patient with underlying history of myopathy which is resulting in lower extremity weakness.  Had been referred to [facility] by Dr. Ricardo vigil with however patient declines referral due to distance and desire to avoid further doctors at this time.  Significant stressors associated with wife passing away as well as his son dying January 23, 2018 at age 53.  Patient understands need for medication for benefits of depression management.  Wondering about possible recall and blood pressure medications including amlodipine  and lisinopril.  Has remained off simvastatin due to concerns with myalgias etc.     (10/10/17) - Vanessa x 1963 (she  of lung CA)  1 son (Juwan) -  (18) at age 53 - diabetes, OxyContin and EtOH use, frequent falls, etc.  Tobacco: 1 ppd  EtOH: none  Mom - living (will be 103 in ) - at [facility] 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 Martinez    Past Surgical History:   Procedure Laterality Date     APPENDECTOMY       CHOLECYSTECTOMY       MUSCLE BIOPSY Right 3/11/2016    Procedure: RIGHT SEMIMEMBRANOUS MUSCLE BIOPSY (RIGHT THIGH);  Surgeon: Subhash York MD;  Location: [facility];  Service:         Family History   Problem Relation Age of Onset     Cancer Mother      Diabetes Son         Past Medical History:   Diagnosis Date     Anemia     pernicious anemia     Balance problem      BPH (benign prostatic hyperplasia)      Cancer (H)     basal cell CA removed from multiple sites     COPD (chronic obstructive pulmonary disease) (H)      Hyperlipidemia      Hypertension      Leg weakness, bilateral      Seizures (H)     epilepsy with recurrent seizures, last seizure activity 5 yrs ago     Shortness of breath         Social History     Tobacco Use     Smoking status: Current Every Day Smoker     Packs/day: 1.00     Types: Cigarettes     Smokeless tobacco: Never Used   Substance Use Topics     Alcohol use: No     Drug use: No        Current Outpatient Medications   Medication Sig Dispense Refill     amLODIPine (NORVASC) 10 MG tablet TAKE 1 TABLET BY MOUTH DAILY 90 tablet 0     aspirin 81 MG EC tablet Take 81 mg by mouth daily.       CHOLECALCIFEROL, VITAMIN D3, (VITAMIN D3 ORAL) Take 2,000 Units by mouth daily.         cyanocobalamin 1,000 mcg/mL injection Inject 1,000 mcg into the shoulder, thigh, or buttocks every 30 (thirty) days.       docusate sodium 100 mg capsule Take 100 mg by mouth daily.       levETIRAcetam (KEPPRA) 500 MG tablet TAKE 1 TABLET BY MOUTH TWICE DAILY 180 tablet 0     lisinopril (PRINIVIL,ZESTRIL) 40 MG tablet TAKE 1 TABLET BY MOUTH DAILY 90 tablet 3     polyethylene glycol (MIRALAX) 17 gram packet Take 17 g by mouth daily as needed.       senna (SENOKOT) 8.6 mg tablet Take 1 tablet by mouth 2 (two) times a day.        simvastatin (ZOCOR) 20 MG tablet Take 20 mg by mouth daily.       tamsulosin (FLOMAX) 0.4 mg Cp24 Take 1 capsule (0.4 mg total) by mouth daily. 90 capsule 4     escitalopram oxalate (LEXAPRO) 5 MG tablet Take 1 tablet (5 mg total) by mouth daily. 30 tablet 2     No current facility-administered medications for this visit.           Objective:    Vitals:    12/18/18 1511 12/18/18 1534   BP: 140/60 134/60   Pulse: 60    SpO2: 98%    Weight: 161 lb (73 kg)       Body mass index is 20.12 kg/m .    Alert.  Somewhat cantankerous affect.  Diminished breath sounds.  Cardiac exam regular.  Blood pressure 134/60.  1+ femoral pulse bilateral with 2+ popliteal pulse bilateral.  Absent dorsalis pedis and posterior tibial pulse on lower extremity exam without peripheral edema.  Does demonstrate significant difficulty moving from seated position to standing position and ambulates without assist cautiously.      This note has been dictated using voice recognition software and as a result may contain minor grammatical errors and unintended word substitutions.

## 2021-06-22 NOTE — PROGRESS NOTES
Assessment/Plan:      Diagnoses and all orders for this visit:    Lumbar spine pain    Facet arthropathy, lumbar    Myofascial pain    Myopathy    Proximal muscle weakness        Assessment:78-year-old gentleman with a history of melanoma, COPD, hyperlipidemia hypertension, seizures with:        1.  Chronic lumbar spine pain.  This is throughout the lumbar spine into the thoracic spine.  Multifactorial including facet arthropathy and likely from his myopathy symptoms as well.  He has degenerative changes in the lumbar spine with severe facet arthropathy most significant L4-5 as well as moderate L5-S1.  2.  Fairly significant myofascial pain throughout.  He does have worsening generalized weakness and fatigue most significant in the proximal muscles.  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  Has been seen by neurology refuses to be seen at the [facility] neuromuscular clinic.    3.  Reported weight loss and likely has depression after the death of his wife and son.      Discussion:    1. I discussed the diagnosis and treatment options with the patient.  We discussed myopathy at length today.  We discussed the treatment for myopathy which is generally supportive care and the need for definitive diagnosis.  He remains adamant that he would not like a referral to [facility].  He declines any physical therapy.  He has a walker at home and is encouraged to continue to use that to decrease his fall risk.  Given no significant ankle dorsiflexor weakness today do not feel that AFOs are warranted at this time.    2.  I discussed options for the lumbar spine pain including physical therapy and interventions/injections.  He is not interested in any further appointments.    3.  I highly recommend behavioral health evaluation and he declines this today.    4.  He has several questions about blood pressure and has reported a  weight loss as well.  I would defer him to his primary care provider as his weight loss may be related to depression, but he should be evaluated.  He does have a history of bladder cancer and is father.  I have reached out to Dr. Leonard and the patient has an appointment with Dr. Leonard in the next month.  My hope is that the patient could be seen sooner as he is on a cancellation list.    5.  Follow-up with me as needed.    35 minutes were spent with this patient in addition to any procedure with greater than 50% in counseling and coordination of care.      It was our pleasure caring for your patient today, if there any questions or concerns please do not hesitate to contact us.      Subjective:   Patient ID: Adrián Arita is a 78 y.o. male.    History of Present Illness: Patient presents for evaluation of worsening lower extremity weakness increasing falls and low back pain.  Symptoms continue to worsen over time.  Pain is throughout the lumbar spine midline up into the thoracic spine.  He notes weakness in lower extremities most significant in his hips difficult for him to get off the floor out of a chair without pushing up with his arms.  Less weakness in his arms.  He is not noticing weakness in the ankles or feet.  He does use a walker for distances or at home and has a cane but does not feel that that helpful.    He has been seen by neurology and I reviewed the notes.  It was felt that he does have a myopathy.  Was recommended he be seen by the [facility] and he is to.  He also reports to me being quite busy at home.  His wife and son have passed away over the past year and he is involved in numerous organizational tasks.  He reports weight loss and his questions about his general memory care provider next month.      Imaging: I reviewed the MRI lumbar spine.  Images and report personally reviewed.  This shows significant degenerative changes throughout the lumbar spine most significant with  facets at L4-5 with fluid in the facet joints and moderate facet arthropathy L5-S1.    Review of Systems: Complains of numbness and tingling in his legs, urinary urgency.  He has generalized weakness most significant proximally.  He has blurred vision and poor balance.  No headaches dizziness nausea or vomiting.    Past Medical History:   Diagnosis Date     Anemia     pernicious anemia     Balance problem      BPH (benign prostatic hyperplasia)      Cancer (H)     basal cell CA removed from multiple sites     COPD (chronic obstructive pulmonary disease) (H)      Hyperlipidemia      Hypertension      Leg weakness, bilateral      Seizures (H)     epilepsy with recurrent seizures, last seizure activity 5 yrs ago     Shortness of breath        The following portions of the patient's history were reviewed and updated as appropriate: allergies, current medications, past family history, past medical history, past social history, past surgical history and problem list.      Objective:   Physical Exam:    Vitals:    12/05/18 1535   BP: 180/78       General: Alert and oriented with flat affect. Attention, knowledge, memory, and language are intact. No acute distress.   Eyes: Sclerae are clear.  Respirations: Unlabored. CV: No lower extremity edema.   ensation is intact to light touch throughout the lower extremities.  Reflexes are 1+ patellar and 0 Achilles     Manual muscle testing reveals:  Right /Left out of 5     3/3 hip flexors  5/5 knee flexors  5/5 knee extensors  5/5 ankle plantar flexors  5-/5- ankle dorsiflexors  5/5ankle evertors

## 2021-06-23 NOTE — PROGRESS NOTES
Assessment and Plan:       1. Encounter for general adult medical examination with abnormal findings  Annual wellness visit completed.  Multiple risks associated with depressed mood.  Assistance needed with certain activities of daily living etc.  Immunizations reviewed however patient declines tetanus booster etc.    2. Essential hypertension  Hypertension, stable.  Blood pressure 120/60.  Continues lisinopril 40 mg daily and amlodipine 10 mg daily.  - Electrocardiogram Perform and Read    3. Leg weakness, bilateral  Bilateral leg weakness.  Evidence for myopathy per Dr. Ricardo Hillman.  Had been referred to [facility] however declines referral for further assessment.  Component of peripheral arterial disease likely.  - CK Total    4. Peripheral arterial disease (H)  Peripheral arterial disease noted.  Ongoing smoking noted unfortunately.  Declines smoking cessation efforts.  Continues aspirin 81 mg daily.    5. Major depressive disorder, recurrent episode, moderate (H)  Major depression.  PHQ 9 questionnaire 10 out of 27 and MEHUL 7 questionnaire 12 out of 21 however declines dose adjustment of the citalopram 5 mg daily.  Reassess at follow-up in 12 weeks.    6. Nonintractable epilepsy without status epilepticus, unspecified epilepsy type (H)  Continues Keppra for seizure prophylaxis.    7. Chronic obstructive pulmonary disease, unspecified COPD type (H)  COPD history with ongoing smoking unfortunately.  Chest x-ray with hyperinflation otherwise appears negative.  - XR Chest 2 Views    8. Dysphagia, unspecified type  Dysphasia with abnormal weight loss.  Upper GI to be completed.  Chest x-ray appears negative.  - Ambulatory referral for Upper GI Endoscopy  - XR Chest 2 Views    9. Abnormal weight loss  As above, endoscopy to be completed.  Chest x-ray appears negative for obvious etiology for abnormal weight loss and difficulty swallowing.  Check TSH, comprehensive metabolic panel and CBC.  - Ambulatory  referral for Upper GI Endoscopy  - XR Chest 2 Views  - Thyroid Stimulating Hormone (TSH)  - Comprehensive Metabolic Panel  - HM2(CBC w/o Differential)        The patient's current medical problems were reviewed.    I have had an Advance Directives discussion with the patient.     The following health maintenance schedule was reviewed with the patient and provided in printed form in the after visit summary:   Health Maintenance   Topic Date Due     ZOSTER VACCINES (1 of 2) 1989     ADVANCE DIRECTIVES DISCUSSED WITH PATIENT  2011     TD 18+ HE  2016     INFLUENZA VACCINE RULE BASED (1) 2018     DEPRESSION FOLLOW UP  07/15/2019     FALL RISK ASSESSMENT  01/15/2020     PNEUMOCOCCAL POLYSACCHARIDE VACCINE AGE 65 AND OVER  Completed     PNEUMOCOCCAL CONJUGATE VACCINE FOR ADULTS (PCV13 OR PREVNAR)  Completed        Subjective:     Chief Complaint: Adrián Arita is an 78 y.o. male here for an Annual Wellness visit.     HPI: Patient seen today for annual wellness visit.  Abnormal weight loss however weight does appear stable since prior office visit 2018.  2 pound weight gain since 2018.  States that he used to weigh closer to 230 pounds in the past.  Difficulty swallowing at times.  Has had lower colonoscopy 2016 tubular adenoma.  Leg weakness.  Diagnosed with myopathy in the past however declined referral to [facility].  Mild chest ache at times without palpitations.  Not with exertion necessarily.  Not using simvastatin.  History of BPH.  Tamsulosin 0.4 mg daily.  Keppra 500 mg twice daily for seizure prophylaxis with history of epilepsy.  Baseline cough without hemoptysis.  No nausea or vomiting.  No abdominal bloating.    Review of Systems:  Please see above.  The rest of the review of systems are negative for all systems.     (10/10/17) - Vanessa x 1963 (she  of lung CA)  1 son (Juwan) -  (18) at age 53 - diabetes, OxyContin  and EtOH use, frequent falls, etc.  Tobacco: 1 ppd  EtOH: none  Mom - living (will be 103 in ) - at [facility] 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 Martinez    Patient Care Team:  Filiberto Leonard MD as PCP - General (Family Medicine)     Patient Active Problem List   Diagnosis     COPD (chronic obstructive pulmonary disease) (H)     Benign Adenomatous Polyp Of The Large Intestine     Hyperlipidemia     Pernicious Anemia     Essential Hypertension     Benign Prostatic Hypertrophy     Chest Pain     Lower Back Pain     Epilepsy And Recurrent Seizures     Conjunctivitis     Ecchymosis     Paraparesis (Lower Extremities)     Unspecified hereditary and idiopathic peripheral neuropathy     Syncope and collapse     Tobacco use disorder     Preop examination     Past Medical History:   Diagnosis Date     Anemia     pernicious anemia     Balance problem      BPH (benign prostatic hyperplasia)      Cancer (H)     basal cell CA removed from multiple sites     COPD (chronic obstructive pulmonary disease) (H)      Hyperlipidemia      Hypertension      Leg weakness, bilateral      Seizures (H)     epilepsy with recurrent seizures, last seizure activity 5 yrs ago     Shortness of breath       Past Surgical History:   Procedure Laterality Date     APPENDECTOMY       CHOLECYSTECTOMY       MUSCLE BIOPSY Right 3/11/2016    Procedure: RIGHT SEMIMEMBRANOUS MUSCLE BIOPSY (RIGHT THIGH);  Surgeon: Subhash York MD;  Location: [facility] [place];  Service:       Family History   Problem Relation Age of Onset     Cancer Mother      Diabetes Son       Social History     Socioeconomic History     Marital status:      Spouse name: Not on file     Number of children: Not  "on file     Years of education: Not on file     Highest education level: Not on file   Social Needs     Financial resource strain: Not on file     Food insecurity - worry: Not on file     Food insecurity - inability: Not on file     Transportation needs - medical: Not on file     Transportation needs - non-medical: Not on file   Occupational History     Not on file   Tobacco Use     Smoking status: Current Every Day Smoker     Packs/day: 1.00     Types: Cigarettes     Smokeless tobacco: Never Used   Substance and Sexual Activity     Alcohol use: No     Drug use: No     Sexual activity: Not on file   Other Topics Concern     Not on file   Social History Narrative     Not on file      Current Outpatient Medications   Medication Sig Dispense Refill     amLODIPine (NORVASC) 10 MG tablet TAKE 1 TABLET BY MOUTH DAILY 90 tablet 0     aspirin 81 MG EC tablet Take 81 mg by mouth daily.       CHOLECALCIFEROL, VITAMIN D3, (VITAMIN D3 ORAL) Take 2,000 Units by mouth daily.        cyanocobalamin 1,000 mcg/mL injection Inject 1,000 mcg into the shoulder, thigh, or buttocks every 30 (thirty) days.       docusate sodium 100 mg capsule Take 100 mg by mouth daily.       escitalopram oxalate (LEXAPRO) 5 MG tablet TAKE 1 TABLET BY MOUTH EVERY DAY 90 tablet 0     levETIRAcetam (KEPPRA) 500 MG tablet TAKE 1 TABLET BY MOUTH TWICE DAILY 180 tablet 0     lisinopril (PRINIVIL,ZESTRIL) 40 MG tablet TAKE 1 TABLET BY MOUTH DAILY 90 tablet 3     polyethylene glycol (MIRALAX) 17 gram packet Take 17 g by mouth daily as needed.       senna (SENOKOT) 8.6 mg tablet Take 1 tablet by mouth 2 (two) times a day.        tamsulosin (FLOMAX) 0.4 mg Cp24 Take 1 capsule (0.4 mg total) by mouth daily. 90 capsule 4     No current facility-administered medications for this visit.       Objective:   Vital Signs:   Visit Vitals  /60   Pulse (!) 58   Ht 6' 3\" (1.905 m)   Wt 161 lb (73 kg)   SpO2 98%   BMI 20.12 kg/m         VisionScreening:  No exam data " "present         Objective:     /60   Pulse (!) 58   Ht 6' 3\" (1.905 m)   Wt 161 lb (73 kg)   SpO2 98%   BMI 20.12 kg/m    Body mass index is 20.12 kg/m .    Physical    General Appearance:    Alert, cooperative, no distress, appears stated age.  Transfers extremely slowly due to leg weakness.   Head:    Normocephalic, without obvious abnormality, atraumatic   Eyes:    PERRL, conjunctiva/corneas clear, EOM's intact, fundi     benign, both eyes.  Glasses.        Ears:    Normal TM's and external ear canals, both ears   Nose:   Nares normal, septum midline, mucosa normal, no drainage    or sinus tenderness   Throat:   Lips, mucosa, and tongue normal; teeth and gums normal   Neck:   Supple, symmetrical, trachea midline, no adenopathy;        thyroid:  No enlargement/tenderness/nodules; no carotid    bruit or JVD   Back:     Symmetric, no curvature, ROM normal, no CVA tenderness   Lungs:     Clear to auscultation bilaterally, respirations unlabored   Chest wall:    No tenderness or deformity   Heart:    Regular rate and rhythm, S1 and S2 normal, no murmur, rub   or gallop   Abdomen:     Soft, non-tender, bowel sounds active all four quadrants,     no masses, no organomegaly.     Genitalia:    deferred    Rectal:    deferred   Extremities:   Extremities normal, atraumatic, no cyanosis or edema   Pulses:   2+ and symmetric all extremities   Skin:   Skin color, texture, turgor normal, no rashes or lesions   Lymph nodes:   Cervical, supraclavicular, and axillary nodes normal   Neurologic:   CNII-XII intact. Decreased strength, o/w normal sensation         XR CHEST 2 VIEWS  1/15/2019 10:29 AM     INDICATION: Chronic obstructive pulmonary disease, unspecified  COMPARISON: None.     FINDINGS: Normal cardiac size. Pulmonary vasculature are within normal limits. No airspace opacity, pleural effusion or pneumothorax. Slightly hyperinflated lungs can be seen with obstructive pulmonary disease.        EKG:  Sinus " bradycardia with v-rate 52 bpm otherwise normal.       This note has been dictated using voice recognition software and as a result may contain minor grammatical errors and unintended word substitutions.     Assessment Results 1/15/2019   Activities of Daily Living No help needed   Instrumental Activities of Daily Living No help needed   Get Up and Go Score 12 seconds or more   Mini Cog Total Score 2   Some recent data might be hidden     A Mini-Cog score of 0-2 suggests the possibility of dementia, score of 3-5 suggests no dementia    Identified Health Risks:     The patient was provided with suggestions to help him develop a healthy physical lifestyle.   He is at risk for lack of exercise and has been provided with information to increase physical activity for the benefit of his well-being.  The patient was counseled and encouraged to consider modifying their diet and eating habits. He was provided with information on recommended healthy diet options.  The patient was provided with suggestions to help him develop a healthy emotional lifestyle.   The patient s PHQ-9 score is consistent with moderate depression.  He was provided with information regarding depression and was advised to schedule a follow up appointment in 12 weeks to further address this issue.  He is at risk for falling and has been provided with information to reduce the risk of falling at home.  Information regarding advance directives (living [place]), including where he can download the appropriate form, was provided to the patient via the AVS.       The patient was provided with appropriate referrals to address his memory problem.

## 2021-06-23 NOTE — TELEPHONE ENCOUNTER
Refill Approved    Rx renewed per Medication Renewal Policy. Medication was last renewed on 11/4/18.    Daly Tadeo, [facility] Triage/Med Refill 2/5/2019     Requested Prescriptions   Pending Prescriptions Disp Refills     amLODIPine (NORVASC) 10 MG tablet [Pharmacy Med Name: AMLODIPINE BESYLATE 10MG TABLETS] 90 tablet 0     Sig: TAKE 1 TABLET BY MOUTH DAILY.    Calcium-Channel Blockers Protocol Passed - 2/3/2019 10:45 AM       Passed - PCP or prescribing provider visit in past 12 months or next 3 months    Last office visit with prescriber/PCP: 12/18/2018 Filiberto Leonard MD OR same dept: 12/18/2018 Filiberto Leonard MD OR same specialty: 12/18/2018 Filiberto Leonard MD  Last physical: 1/15/2019 Last MTM visit: Visit date not found   Next visit within 3 mo: Visit date not found  Next physical within 3 mo: Visit date not found  Prescriber OR PCP: Filiberto Leonard MD  Last diagnosis associated with med order: 1. Essential hypertension  - amLODIPine (NORVASC) 10 MG tablet [Pharmacy Med Name: AMLODIPINE BESYLATE 10MG TABLETS]; TAKE 1 TABLET BY MOUTH DAILY  Dispense: 90 tablet; Refill: 0    If protocol passes may refill for 12 months if within 3 months of last provider visit (or a total of 15 months).            Passed - Blood pressure filed in past 12 months    BP Readings from Last 1 Encounters:   01/15/19 120/60

## 2021-06-23 NOTE — TELEPHONE ENCOUNTER
Refill Approved    Rx renewed per Medication Renewal Policy. Medication was last renewed on 11/4/18.    Daly Tadeo, [facility] Triage/Med Refill 1/28/2019     Requested Prescriptions   Pending Prescriptions Disp Refills     levETIRAcetam (KEPPRA) 500 MG tablet [Pharmacy Med Name: LEVETIRACETAM 500MG TABLETS] 180 tablet 0     Sig: TAKE 1 TABLET BY MOUTH TWICE DAILY.    Gabapentin/Levetiracetam/Tiagabine Refill Protocol  Passed - 1/26/2019  5:33 PM       Passed - PCP or prescribing provider visit in past 12 months or next 3 months    Last office visit with prescriber/PCP: 12/18/2018 Filiberto Leonard MD OR same dept: 12/18/2018 Filiberto Leonard MD OR same specialty: 12/18/2018 Filiberto Leonard MD  Last physical: 1/15/2019 Last MTM visit: Visit date not found   Next visit within 3 mo: Visit date not found  Next physical within 3 mo: Visit date not found  Prescriber OR PCP: Filiberto Leonard MD  Last diagnosis associated with med order: 1. Epilepsy (H)  - levETIRAcetam (KEPPRA) 500 MG tablet [Pharmacy Med Name: LEVETIRACETAM 500MG TABLETS]; TAKE 1 TABLET BY MOUTH TWICE DAILY  Dispense: 180 tablet; Refill: 0    If protocol passes may refill for 12 months if within 3 months of last provider visit (or a total of 15 months).

## 2021-06-24 NOTE — TELEPHONE ENCOUNTER
Refill Approved    Rx renewed per Medication Renewal Policy. Medication was last renewed on 1/16/18.    Erika Joyner, [facility] Connection Triage/Med Refill 3/10/2019     Requested Prescriptions   Pending Prescriptions Disp Refills     lisinopril (PRINIVIL,ZESTRIL) 40 MG tablet [Pharmacy Med Name: LISINOPRIL 40MG TABLETS] 90 tablet 0     Sig: TAKE 1 TABLET BY MOUTH EVERY DAY    Ace Inhibitors Refill Protocol Passed - 3/9/2019  6:25 PM       Passed - PCP or prescribing provider visit in past 12 months      Last office visit with prescriber/PCP: 3/6/2019 Filiberto Leonard MD OR same dept: 3/6/2019 Filiberto Leonard MD OR same specialty: 3/6/2019 Filiberto Leonard MD  Last physical: 1/15/2019 Last MTM visit: Visit date not found   Next visit within 3 mo: Visit date not found  Next physical within 3 mo: Visit date not found  Prescriber OR PCP: Filiberto Leonard MD  Last diagnosis associated with med order: 1. Essential hypertension  - lisinopril (PRINIVIL,ZESTRIL) 40 MG tablet [Pharmacy Med Name: LISINOPRIL 40MG TABLETS]; TAKE 1 TABLET BY MOUTH EVERY DAY  Dispense: 90 tablet; Refill: 0    If protocol passes may refill for 12 months if within 3 months of last provider visit (or a total of 15 months).            Passed - Serum Potassium in past 12 months    Lab Results   Component Value Date    Potassium 4.3 01/15/2019            Passed - Blood pressure filed in past 12 months    BP Readings from Last 1 Encounters:   03/06/19 126/60            Passed - Serum Creatinine in past 12 months    Creatinine   Date Value Ref Range Status   01/15/2019 0.71 0.70 - 1.30 mg/dL Final

## 2021-06-24 NOTE — PROGRESS NOTES
"Assessment/Plan:    1. Dizzy spells  Dizzy spells question presyncope versus tachybradycardia arrhythmia versus seizure activity etc.  Patient was referred back to neurology to complete EEG and make sure not having seizure concerns with history.  Will also discontinue amlodipine 10 mg daily and continue lisinopril 40 mg daily.  Will remain off escitalopram 5 mg daily which she had recently cut back to 2.5 mg as well.  Reassessment in office no later than 4 weeks.  - Ambulatory referral to Neurology    2. Leg weakness, bilateral  Bilateral leg weakness with history of myopathy per [facility] September 19, 2018 however patient declined further evaluation regarding this.  Did not want to go to [facility].    3. Peripheral arterial disease (H)  Peripheral arterial disease with ongoing smoking history.  Smoking cessation efforts to continue.  Activity as tolerated.    4. Nonintractable epilepsy without status epilepticus, unspecified epilepsy type (H)  History of seizure disorder with \"blackouts\" better with Keppra 500 mg twice daily.  Patient will see Dr. Mccollum in follow-up to ensure no breakthrough symptoms of seizure activity while on Keppra 500 mg twice daily.  - Ambulatory referral to Neurology    5. Chronic obstructive pulmonary disease, unspecified COPD type (H)  COPD, stable.    6. Essential hypertension  Hypertension with blood pressure 126/60 on recheck at follow-up after discontinuing amlodipine 10 mg daily to ensure no evidence for orthostatic hypotension while continuing lisinopril 40 mg daily.    7.  Major depression  Patient like to discontinue escitalopram 5 mg which he had recently cut back to 2.5 mg daily in order to see if improvement in dizzy spells and leg weakness.      Subjective:    Adrián Arita is seen today for recent fall.  Falling more often.  Feels \"funny\" and then will go down without passing out.  No blackout concerns which he has had in the " distant past before diagnosis with seizure disorder.  Using Keppra 500 mg twice daily consistently and tolerates this fine.  This initially did help.  [facility] and question of myopathy however was resistant to going to [facility] for further evaluation.  Recent evaluation dysphasia with endoscopy showing hiatal hernia otherwise unremarkable.  Known history of peripheral arterial disease and continues to smoke.  COPD history.  Comprehensive review of systems as above otherwise all negative.  Some headaches often more occipital.  No vision change.  No head injuries.     (10/10/17) - Vanessa x 1963 (she  of lung CA)  1 son Mk) -  (18) at age 53 - diabetes, OxyContin and EtOH use, frequent falls, etc.  Tobacco: 1 ppd  EtOH: none  Mom - living (will be 103 in ) - at [facility] 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 Martinez    Past Surgical History:   Procedure Laterality Date     APPENDECTOMY       CHOLECYSTECTOMY       MUSCLE BIOPSY Right 3/11/2016    Procedure: RIGHT SEMIMEMBRANOUS MUSCLE BIOPSY (RIGHT THIGH);  Surgeon: Subhash York MD;  [facility] [place];  Service:         Family History   Problem Relation Age of Onset     Cancer Mother      Diabetes Son         Past Medical History:   Diagnosis Date     Anemia     pernicious anemia     Balance problem      BPH (benign prostatic hyperplasia)      Cancer (H)     basal cell CA removed from multiple sites     COPD (chronic obstructive pulmonary disease) (H)      Hyperlipidemia      Hypertension      Leg weakness, bilateral      Seizures (H)     epilepsy with recurrent seizures, last seizure activity 5 yrs ago      Shortness of breath         Social History     Tobacco Use     Smoking status: Current Every Day Smoker     Packs/day: 1.00     Types: Cigarettes     Smokeless tobacco: Never Used   Substance Use Topics     Alcohol use: No     Drug use: No        Current Outpatient Medications   Medication Sig Dispense Refill     amLODIPine (NORVASC) 10 MG tablet TAKE 1 TABLET BY MOUTH DAILY. 90 tablet 3     aspirin 81 MG EC tablet Take 81 mg by mouth daily.       CHOLECALCIFEROL, VITAMIN D3, (VITAMIN D3 ORAL) Take 2,000 Units by mouth daily.        cyanocobalamin 1,000 mcg/mL injection Inject 1,000 mcg into the shoulder, thigh, or buttocks every 30 (thirty) days.       docusate sodium 100 mg capsule Take 100 mg by mouth daily.       escitalopram oxalate (LEXAPRO) 5 MG tablet TAKE 1 TABLET BY MOUTH EVERY DAY (Patient taking differently: TAKE 1 TABLET BY MOUTH EVERY DAY - pt only doing a half a tab) 90 tablet 0     levETIRAcetam (KEPPRA) 500 MG tablet TAKE 1 TABLET BY MOUTH TWICE DAILY. 180 tablet 3     lisinopril (PRINIVIL,ZESTRIL) 40 MG tablet TAKE 1 TABLET BY MOUTH DAILY 90 tablet 3     multivitamin with minerals (THERA-M) 9 mg iron-400 mcg Tab tablet Take 1 tablet by mouth daily.       polyethylene glycol (MIRALAX) 17 gram packet Take 17 g by mouth daily as needed.       senna (SENOKOT) 8.6 mg tablet Take 1 tablet by mouth 2 (two) times a day.        tamsulosin (FLOMAX) 0.4 mg Cp24 Take 1 capsule (0.4 mg total) by mouth daily. 90 capsule 4     No current facility-administered medications for this visit.           Objective:    Vitals:    03/06/19 1312   BP: 126/60   Pulse: 77   SpO2: 98%   Weight: 160 lb (72.6 kg)      Body mass index is 20 kg/m .    Alert.  No apparent distress.  Sitting in wheelchair.  Femoral pulses palpable along with posterior tibial pulses left greater than right.  Diminished dorsalis pedis and posterior tibial pulses bilateral feet otherwise no ankle swelling.  Extremities warm and dry  otherwise.       (10/10/17) - Vanessa x 1963 (she  of lung CA)  1 son (Juwan) -  (18) at age 53 - diabetes, OxyContin and EtOH use, frequent falls, etc.  Tobacco: 1 ppd  EtOH: none  Mom - living (will be 103 in ) - at [facility] 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 Martinez      This note has been dictated using voice recognition software and as a result may contain minor grammatical errors and unintended word substitutions.

## 2021-06-25 NOTE — TELEPHONE ENCOUNTER
"FYI - Status Update  Who is Calling: Friend/caregiver Za  [phone]  Update: Reason for call is that she is requesting if Dr Leonard would provide a written statement regarding patient being \"sound of mind\"    [place] from caller patient had been transpoorted to [facility] where he received care for 6 days, prior to his death on 12/06/2020.    Caller named executor in patient's will, leaving his estate to his two grandchildren. But now some family are challenging that, saying patient was not competent.      Okay to leave a detailed message?: Caller requests call back.    "

## 2021-06-25 NOTE — TELEPHONE ENCOUNTER
I cannot unfortunately submit letter.   or  would need to decide if paperwork completed appropriately for Za to serve as executer.

## 2021-07-03 NOTE — ADDENDUM NOTE
Addendum Note by Ricardo Hillman DO at 8/16/2018  2:31 PM     Author: Ricardo Hillman DO Service: -- Author Type: Physician    Filed: 8/16/2018  2:31 PM Date of Service: 8/16/2018  2:31 PM Status: Signed    : Ricardo Hillman DO (Physician)    Encounter addended by: Ricardo Hillman DO on: 8/16/2018  2:31 PM<BR>     Actions taken: Charge Capture section accepted

## 2021-08-06 NOTE — PATIENT INSTRUCTIONS - HE
Patient Instructions by Filiberto Leonard MD at 1/15/2019  9:40 AM     Author: Filiberto Leonard MD Service: -- Author Type: Physician    Filed: 1/15/2019 10:00 AM Encounter Date: 1/15/2019 Status: Signed    : Filiberto Leonard MD (Physician)         Patient Education     Your Health Risk Assessment indicates you feel you are not in good physical health.    A healthy lifestyle helps keep the body fit and the mind alert. It helps protect you from disease, helps you fight disease, and helps prevent chronic disease (disease that doesn't go away) from getting worse. This is important as you get older and begin to notice twinges in muscles and joints and a decline in the strength and stamina you once took for granted. A healthy lifestyle includes good healthcare, good nutrition, weight control, recreation, and regular exercise. Avoid harmful substances and do what you can to keep safe. Another part of a healthy lifestyle is stay mentally active and socially involved.    Good healthcare     Have a wellness visit every year.     If you have new symptoms, let us know right away. Don't wait until the next checkup.     Take medicines exactly as prescribed and keep your medicines in a safe place. Tell us if your medicine causes problems.   Healthy diet and weight control     Eat 3 or 4 small, nutritious, low-fat, high-fiber meals a day. Include a variety of fruits, vegetables, and whole-grain foods.     Make sure you get enough calcium in your diet. Calcium, vitamin D, and exercise help prevent osteoporosis (bone thinning).     If you live alone, try eating with others when you can. That way you get a good meal and have company while you eat it.     Try to keep a healthy weight. If you eat more calories than your body uses for energy, it will be stored as fat and you will gain weight.     Recreation   Recreation is not limited to sports and team events. It includes any activity that provides relaxation, interest,  enjoyment, and exercise. Recreation provides an outlet for physical, mental, and social energy. It can give a sense of worth and achievement. It can help you stay healthy.       Patient Education     Exercise for a Healthier Heart  You may wonder how you can improve the health of your heart. If youre thinking about exercise, youre on the right track. You dont need to become an athlete, but you do need a certain amount of brisk exercise to help strengthen your heart. If you have been diagnosed with a heart condition, your doctor may recommend exercise to help stabilize your condition. To help make exercise a habit, choose safe, fun activities.       Be sure to check with your health care provider before starting an exercise program.    Why exercise?  Exercising regularly offers many healthy rewards. It can help you do all of the following:    Improve your blood cholesterol levels to help prevent further heart trouble    Lower your blood pressure to help prevent a stroke or heart attack    Control diabetes, or reduce your risk of getting this disease    Improve your heart and lung function    Reach and maintain a healthy weight    Make your muscles stronger and more limber so you can stay active    Prevent falls and fractures by slowing the loss of bone mass (osteoporosis)    Manage stress better  Exercise tips  Ease into your routine. Set small goals. Then build on them.  Exercise on most days. Aim for a total of 150 or more minutes of moderate to  vigorous intensity activity each week. Consider 40 minutes, 3 to 4 times a week. For best results, activity should last for 40 minutes on average. It is OK to work up to the 40 minute period over time. Examples of moderate-intensity activity is walking one mile in 15 minutes or 30 to 45 minutes of yard work.  Step up your daily activity level. Along with your exercise program, try being more active throughout the day. Walk instead of drive. Do more household tasks or yard  work.  Choose one or more activities you enjoy. Walking is one of the easiest things you can do. You can also try swimming, riding a bike, or taking an exercise class.  Stop exercising and call your doctor if you:    Have chest pain or feel dizzy or lightheaded    Feel burning, tightness, pressure, or heaviness in your chest, neck, shoulders, back, or arms    Have unusual shortness of breath    Have increased joint or muscle pain    Have palpitations or an irregular heartbeat      4123-0128 [employer]. [address] [place] [address]. All rights reserved. This information is not intended as a substitute for professional medical care. Always follow your healthcare professional's instructions.         Patient Education   Understanding [employer] MyPlate  The USDA (US Department of Agriculture) has guidelines to help you make healthy food choices. These are called MyPlate. MyPlate shows the food groups that make up healthy meals using the image of a place setting. Before you eat, think about the healthiest choices for what to put onto your plate or into your cup or bowl. To learn more about building a healthy plate, visit www.choosemyplate.gov.       The Food Groups    Fruits: Any fruit or 100% fruit juice counts as part of the Fruit Group. Fruits may be fresh, canned, frozen, or dried, and may be whole, cut-up, or pureed. Make half your plate fruits and vegetables.    Vegetables: Any vegetable or 100% vegetable juice counts as a member of the Vegetable Group. Vegetables may be fresh, frozen, canned, or dried. They can be served raw or cooked and may be whole, cut-up, or mashed. Make half your plate fruits and vegetables.     Grains: All foods made from grains are part of the Grains Group. These include wheat, rice, oats, cornmeal, and barley such as bread, pasta, oatmeal, cereal, tortillas, and grits. Grains should be no more than a quarter of your plate. At least half of your grains should be whole  grains.    Protein: This group includes meat, poultry, seafood, beans and peas, eggs, processed soy products (like tofu), nuts (including nut butters), and seeds. Make protein choices no more than a quarter of your plate. Meat and poultry choices should be lean or low fat.    Dairy: All fluid milk products and foods made from milk that contain calcium, like yogurt and cheese are part of the Dairy Group. (Foods that have little calcium, such as cream, butter, and cream cheese, are not part of the group.) Most dairy choices should be low-fat or fat-free.    Oils: These are fats that are liquid at room temperature. They include canola, corn, olive, soybean, and sunflower oil. Foods that are mainly oil include mayonnaise, certain salad dressings, and soft margarines. You should have only 5 to 7 teaspoons of oils a day. You probably already get this much from the food you eat.  Use [employer] to Help Build Your Meals  The SuperTracker can help you plan and track your meals and activity. You can look up individual foods to see or compare their nutritional value. You can get guidelines for what and how much you should eat. You can compare your food choices. And you can assess personal physical activities and see ways you can improve. Go to www.[employer].gov/supertracker/.    1520-9108 [employer]. [address] [place] [address]. All rights reserved. This information is not intended as a substitute for professional medical care. Always follow your healthcare professional's instructions.           Patient Education   Your Health Risk Assessment indicates you feel you are not in good emotional health.    Recreation   Recreation is not limited to sports and team events. It includes any activity that provides relaxation, interest, enjoyment, and exercise. Recreation provides an outlet for physical, mental, and social energy. It can give a sense of worth and achievement. It can help you stay  healthy.    Mental Exercise and Social Involvement  Mental and emotional health is as important as physical health. Keep in touch with friends and family. Stay as active as possible. Continue to learn and challenge yourself.   Things you can do to stay mentally active are:    Learn something new, like a foreign language or musical instrument.     Play SCRABBLE or do crossword puzzles. If you cannot find people to play these games with you at home, you can play them with others on your computer through the Internet.     Join a games club--anything from card games to chess or checkers or lawn bowling.     Start a new hobby.     Go back to school.     Volunteer.     Read.     Keep up with world events.       Patient Education   Depression and Suicide in Older Adults  Nearly 2 million older Americans have some type of depression. Sadly, some of them even take their own lives. Yet depression among older adults is often ignored. Learn the warning signs. You may help spare a loved one needless pain. You may also save a life.       What Is Depression?  Depression is a mood disorder that affects the way you think and feel. The most common symptom is a feeling of deep sadness. People who are depressed also may seem tired and listless. And nothing seems to give them pleasure. Its normal to grieve or be sad sometimes. But sadness lessens or passes with time. Depression rarely goes away or improves on its own. Other symptoms of depression are:    Sleeping more or less than normal    Eating more or less than normal    Having headaches, stomachaches, or other pains that dont go away    Feeling nervous, empty, or worthless    Crying a great deal    Thinking or talking about suicide or death    Feeling confused or forgetful  What Causes It?  The causes of depression arent fully known. Certain chemicals in the brain play a role. Depression does run in families. And life stresses can also trigger depression in some people. That may be  the case with older adults. They often face great burdens, such as the death of friends or a spouse. They may have failing health. And they are more likely to be alone, lonely, or poor.  How You Can Help  Often, depressed people may not want to ask for help. When they do, they may be ignored. Or, they may receive the wrong treatment. You can help by showing parents and older friends love and support. If they seem depressed, help them find the right treatment. Talk to your doctor. Or contact a local mental health center, social service agency, or hospital. With modern treatment, no one has to suffer from depression.  Resources:    National [place] of Mental Health  250.648.2589  www.nimh.nih.gov    National [place] on Mental Illness  198.890.9624  www.marlo.org    [facility]  331.164.4759  www.nmha.org    National Suicide Hotline  165.428.8154 (800-SUICIDE)      9745-8262 [employer]. [address] [place] [address]. All rights reserved. This information is not intended as a substitute for professional medical care. Always follow your healthcare professional's instructions.         Patient Education   Preventing Falls in the Home  As you get older, falls are more likely. Thats because your reaction time slows. Your muscles and joints may also get stiffer, making them less flexible. Illness, medications, and vision changes can also affect your balance. A fall could leave you unable to live on your own. To make your home safer, follow these tips:    Floors    Put nonskid pads under area rugs.    Remove throw rugs.    Replace worn floor coverings.    Tack carpets firmly to each step on carpeted stairs. Put nonskid strips on the edges of uncarpeted stairs.    Keep floors and stairs free of clutter and cords.    Arrange furniture so there are clear pathways.    Clean up any spills right away.    Bathrooms    Install grab bars in the tub or shower.    Apply nonskid strips or put a  nonskid rubber mat in the tub or shower.    Sit on a bath chair to bathe.    Use bathmats with nonskid backing.    Lighting    Keep a flashlight in each room.    Put a nightlight along the pathway between the bedroom and the bathroom.    8176-8511 [employer]. [address] [place] [address]. All rights reserved. This information is not intended as a substitute for professional medical care. Always follow your healthcare professional's instructions.         Patient Education   Understanding Advance Care Planning  Advance care planning is the process of deciding ones own future medical care. It helps ensure that if you cant speak for yourself, your wishes can still be carried out. The plan is a series of legal documents that note a persons wishes. The documents vary by state. Advance care planning may be done when a person has a serious illness that is expected to get worse. It may be done before major surgery. And it can help you and your family be prepared in case of a major illness or injury. Advance care planning helps with making decisions at these times.       A health care proxy is a person who acts as the voice of a patient when the patient cant speak for himself or herself. The name of this role varies by state. It may be called a Durable Medical Power of  or Durable Power of  for Healthcare. It may be called an agent, surrogate, or advocate. Or it may be called a representative or decision maker. It is an official duty that is identified by a legal document. The document also varies by state.    Why Is Advance Care Planning Important?  If a person communicates their healthcare wishes:    They will be given medical care that matches their values and goals.    Their family members will not be forced to make decisions in a crisis with no guidance.  Creating a Plan  Making an advance care plan is often done in 3 steps:    Thinking about ones wishes. To create an advance  care plan, you should think about what kind of medical treatment you would want if you lose the ability to communicate. Are there any situations in which you would refuse or stop treatment? Are there therapies you would want or not want? And whom do you want to make decisions for you? There are many places to learn more about how to plan for your care. Ask your doctor or  for resources.    Picking a health care proxy. This means choosing a trusted person to speak for you only when you cant speak for yourself. When you cannot make medical decisions, your proxy makes sure the instructions in your advance care plan are followed. A proxy does not make decisions based on his or her own opinions. They must put aside those opinions and values if needed, and carry out your wishes.    Filling out the legal documents. There are several kinds of legal documents for advance care planning. Each one tells health care providers your wishes. The documents may vary by state. They must be signed and may need to be witnessed or notarized. You can cancel or change them whenever you wish. Depending on your state, the documents may include a Healthcare Proxy form, Living Will, Durable Medical Power of , Advance Directive, or others.  The Familys Role  The best help a family can give is to support their loved ones wishes. Open and honest communication is vital. Family should express any concerns they have about the patients choices while the patient can still make decisions.    7404-5375 [employer]. [address] [place] [address], [place] [address]. All rights reserved. This information is not intended as a substitute for professional medical care. Always follow your healthcare professional's instructions.         Also, [facility] [place] offers a free, downloadable health care directive that allows you to share your treatment choices and personal preferences if you cannot communicate your wishes. It  also allows you to appoint another person (called a health care agent) to make health care decisions if you are unable to do so. You can download an advance directive by going here: [email or web address]     Patient Education   Personalized Prevention Plan  You are due for the preventive services outlined below.  Your care team is available to assist you in scheduling these services.  If you have already completed any of these items, please share that information with your care team to update in your medical record.  Health Maintenance   Topic Date Due   ? ZOSTER VACCINES (1 of 2) 07/28/1989   ? ADVANCE DIRECTIVES DISCUSSED WITH PATIENT  06/29/2011   ? TD 18+ HE  06/29/2016   ? INFLUENZA VACCINE RULE BASED (1) 08/01/2018   ? DEPRESSION FOLLOW UP  07/15/2019   ? FALL RISK ASSESSMENT  01/15/2020   ? PNEUMOCOCCAL POLYSACCHARIDE VACCINE AGE 65 AND OVER  Completed   ? PNEUMOCOCCAL CONJUGATE VACCINE FOR ADULTS (PCV13 OR PREVNAR)  Completed

## 2021-09-07 ENCOUNTER — TELEPHONE (OUTPATIENT)
Dept: FAMILY MEDICINE | Facility: CLINIC | Age: 81
End: 2021-09-07

## 2021-09-07 NOTE — TELEPHONE ENCOUNTER
Pt has passed away and left a large inheritance.     He put his grand kids down as his heirs in his will. Pt's sister in law is fighting that he wasn't not in his right of mind to dictate.     Za dropped off a letter attached to an  letter for Dr. Leonard and Argelia. Za stated her letter sounds off the wall because she was very emotional. She did not mean for it be so flamboyant and convaluted.     Za states she took care of Armando the entire time and the sister in law did not do anything. The children visited their grandpa but the sister in law never did. Za wanted this to be known. (I'm not sure that's in our scope).     She is requesting a call back from Argelia or Dr. Leonard. Its my understanding, she needs documentation stating that Armando was of sound mind in July 2020.
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
## 2021-09-14 NOTE — TELEPHONE ENCOUNTER
Letter created stating patient demonstrated mental competence during my care of patient since 2015.

## 2024-09-12 NOTE — PROGRESS NOTES
Assessment:    1. Essential hypertension  lisinopril (PRINIVIL,ZESTRIL) 40 MG tablet   2. Left arm pain  XR Forearm Left   3. Fall, initial encounter     4. Nonintractable epilepsy without status epilepticus, unspecified epilepsy type     5. Productive cough  XR Chest 2 Views   6. Tobacco use disorder     7. Chronic obstructive pulmonary disease, unspecified COPD type     8. Abnormal weight loss  HM2(CBC w/o Differential)    Thyroid Stimulating Hormone (TSH)    Comprehensive Metabolic Panel         Plan:    40 minutes total time with patient, > 50% with counseling and coordination of cares.  Discussion at length regarding multiple frustrations.  Patient had denial of refills beyond 90 days on blood pressure medication and felt that this was a threat.  Lisinopril 40 mg daily has been utilized and was refilled today for 1 year refill otherwise continues use of amlodipine 10 mg daily as well for hypertension management of blood pressure 136/62 on recheck.  Discussed left arm pain status post fall in which he laid on the ground for perhaps a half an hour or longer however was not aware of how much time had passed before eventually able to get up off the floor.  Will arrange for [facility] and discuss importance of always using a walker to assist with ambulation with significant peripheral arterial disease due to ongoing tobacco use.  No recent seizures described and continues Keppra 500 mg twice daily.  Describes productive cough as well as abnormal weight loss.  Prior chest CT abdomen and pelvis CT negative in 2016.  Chest x-ray today with hyperinflated lungs consistent with COPD and emphysema.  No focal infiltrate or consolidation however will have radiologist review with history of right upper lobe granulomatous disease.  Did check CBC, TSH and comprehensive metabolic panel regarding weight loss however anorexia as well as advanced COPD likely etiology.  Patient declines smoking cessation efforts at this time.  Did  review patient's wife's passing due to lung cancer as well as his son's passing in 2017 from complications of diabetes and narcotic pain medication use.  Would recommend follow-up in this office no later than 3 months for weight recheck etc., sooner with concerns.        Subjective:    Adrián Arita is seen today for multiple concerns.  Upset that his wife had  in the past year in October as well as son earlier in the year in January due to complications of his diabetes.  Wife had  from advanced lung cancer diagnosed 2017.  Reviewed prior history of abnormal chest x-ray in 2016 with subsequent clearing noted on her follow-up x-ray exam.  Patient understanding that of likely pneumonia initially in 2016 however unable to say for sure that lung cancer was not already initially present at the cellular level with her smoking history.  Patient states had fallen perhaps couple weeks ago after trying to get down 3 steps at home.  Weakness in legs.  Uses a walker at home but not outside the house.  Known peripheral arterial disease as well as ongoing tobacco use.  Cough, productive without hemoptysis.  Decreased appetite without nausea vomiting.  No diarrhea.  Continues home medications including simvastatin and Keppra.  No longer on narcotic analgesics.  Denies urinary symptoms.  No dysuria urgency or frequency.  Comprehensive review of systems as above otherwise all negative.     (10/10/17) - Vanessa x 1963 (she  of lung CA)  1 son (Juwan) -  (18) at age 53 - diabetes, OxyContin and EtOH use, frequent falls, etc.  Tobacco: 1 ppd  EtOH: none  Mom - living (will be 103 in ) - at [facility] 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 Martinez    Past Surgical History:   Procedure Laterality Date     APPENDECTOMY       CHOLECYSTECTOMY       MUSCLE BIOPSY Right 3/11/2016    Procedure: RIGHT SEMIMEMBRANOUS MUSCLE BIOPSY (RIGHT THIGH);  Surgeon: Subhash York MD;  [place] [facility] [place]:         History reviewed. No pertinent family history.     Past Medical History:   Diagnosis Date     Anemia     pernicious anemia     Balance problem      BPH (benign prostatic hyperplasia)      Cancer     basal cell CA removed from multiple sites     COPD (chronic obstructive pulmonary disease)      Hyperlipidemia      Hypertension      Leg weakness, bilateral      Seizures     epilepsy with recurrent seizures, last seizure activity 5 yrs ago     Shortness of breath         Social History   Substance Use Topics     Smoking status: Current Every Day Smoker     Packs/day: 1.00     Types: Cigarettes     Smokeless tobacco: Never Used     Alcohol use No        Current Outpatient Prescriptions   Medication Sig Dispense Refill     amLODIPine (NORVASC) 10 MG tablet TAKE 1 TABLET BY MOUTH DAILY 90 tablet 1     aspirin 81 MG EC tablet Take 81 mg by mouth daily.       CHOLECALCIFEROL, VITAMIN D3, (VITAMIN D3 ORAL) Take 2,000 Units by mouth daily.        cyanocobalamin 1,000 mcg/mL injection Inject 1,000 mcg into the shoulder, thigh, or buttocks every 30 (thirty) days.       docusate sodium 100 mg capsule Take 100 mg by mouth daily.       levETIRAcetam (KEPPRA) 500 MG tablet TAKE 1 TABLET BY MOUTH TWICE DAILY 180 tablet 3     lisinopril (PRINIVIL,ZESTRIL) 40 MG tablet TAKE 1 TABLET BY MOUTH DAILY 90 tablet 3     polyethylene glycol (MIRALAX) 17 gram packet Take 17 g by mouth daily as needed.       senna (SENOKOT) 8.6 mg tablet Take 1 tablet by mouth 2 (two) times a day.        tamsulosin (FLOMAX) 0.4 mg Cp24 TAKE 1 CAPSULE BY MOUTH EVERY DAY 90 capsule 2     simvastatin (ZOCOR) 20 MG tablet Take 20 mg by mouth daily.       No current  facility-administered medications for this visit.           Objective:    Vitals:    01/16/18 1421   BP: 150/60   Pulse: 72   Weight: 159 lb (72.1 kg)      Body mass index is 19.87 kg/(m^2).    Alert.  Transfers slowly secondary to leg weakness has difficult time standing from a seated position however then ambulates without cane or walker currently.  Chest with diminished breath sounds without expiratory wheeze or significant inspiratory crackles however.  Cardiac ectopy noted otherwise appears to be baseline regular rhythm without obvious murmur.  Decreased peripheral arterial pulses on lower extremity exam without peripheral edema.      CT CHEST, ABDOMEN, AND PELVIS  5/23/2016 12:16 PM      INDICATION: Abnormal weight loss.  TECHNIQUE: CT chest, abdomen, and pelvis. Dose reduction techniques were used.   IV CONTRAST: None used due to history of severe contrast allergy  COMPARISON: CT abdomen and pelvis 01/05/2012     FINDINGS:   CHEST: Calcified granuloma right upper lobe. Minimal thickening along the right minor fissure. Mild emphysema. Lungs otherwise clear. Moderate coronary artery calcification. No pleural effusion. No thoracic lymphadenopathy.      ABDOMEN: Normal noncontrast liver, spleen, pancreas, adrenal glands, and kidneys.     PELVIS: Sigmoid colonic diverticulosis. No free fluid or lymphadenopathy. No inflammatory changes.     MUSCULOSKELETAL: Degenerative changes in the spine.     IMPRESSION:   CONCLUSION:  1.  Negative CT of the chest, abdomen, and pelvis.      Yes